# Patient Record
Sex: MALE | Race: WHITE | Employment: FULL TIME | URBAN - METROPOLITAN AREA
[De-identification: names, ages, dates, MRNs, and addresses within clinical notes are randomized per-mention and may not be internally consistent; named-entity substitution may affect disease eponyms.]

---

## 2017-01-26 ENCOUNTER — GENERIC CONVERSION - ENCOUNTER (OUTPATIENT)
Dept: OTHER | Facility: OTHER | Age: 62
End: 2017-01-26

## 2017-02-14 ENCOUNTER — GENERIC CONVERSION - ENCOUNTER (OUTPATIENT)
Dept: OTHER | Facility: OTHER | Age: 62
End: 2017-02-14

## 2017-03-20 ENCOUNTER — INPATIENT (INPATIENT)
Facility: HOSPITAL | Age: 62
LOS: 1 days | Discharge: HOME | DRG: 520 | End: 2017-03-22
Payer: COMMERCIAL

## 2017-03-20 VITALS
RESPIRATION RATE: 18 BRPM | DIASTOLIC BLOOD PRESSURE: 97 MMHG | HEIGHT: 75 IN | HEART RATE: 80 BPM | TEMPERATURE: 98 F | SYSTOLIC BLOOD PRESSURE: 132 MMHG | WEIGHT: 201.94 LBS | OXYGEN SATURATION: 97 %

## 2017-03-20 DIAGNOSIS — Z01.818 ENCOUNTER FOR OTHER PREPROCEDURAL EXAMINATION: ICD-10-CM

## 2017-03-20 DIAGNOSIS — K21.9 GASTRO-ESOPHAGEAL REFLUX DISEASE WITHOUT ESOPHAGITIS: ICD-10-CM

## 2017-03-20 DIAGNOSIS — M48.02 SPINAL STENOSIS, CERVICAL REGION: ICD-10-CM

## 2017-03-20 LAB
ALBUMIN SERPL ELPH-MCNC: 3.7 G/DL — SIGNIFICANT CHANGE UP (ref 3.4–5)
ALP SERPL-CCNC: 38 U/L — LOW (ref 40–120)
ALT FLD-CCNC: 27 U/L — SIGNIFICANT CHANGE UP (ref 12–42)
ANION GAP SERPL CALC-SCNC: 6 MMOL/L — LOW (ref 9–16)
APPEARANCE UR: CLEAR — SIGNIFICANT CHANGE UP
APTT BLD: 32.3 SEC — SIGNIFICANT CHANGE UP (ref 27.5–37.4)
AST SERPL-CCNC: 14 U/L — LOW (ref 15–37)
BASOPHILS NFR BLD AUTO: 0.2 % — SIGNIFICANT CHANGE UP (ref 0–2)
BILIRUB SERPL-MCNC: 0.4 MG/DL — SIGNIFICANT CHANGE UP (ref 0.2–1.2)
BILIRUB UR-MCNC: NEGATIVE — SIGNIFICANT CHANGE UP
BLD GP AB SCN SERPL QL: NEGATIVE — SIGNIFICANT CHANGE UP
BUN SERPL-MCNC: 19 MG/DL — SIGNIFICANT CHANGE UP (ref 7–23)
CALCIUM SERPL-MCNC: 9.3 MG/DL — SIGNIFICANT CHANGE UP (ref 8.5–10.5)
CHLORIDE SERPL-SCNC: 109 MMOL/L — HIGH (ref 96–108)
CO2 SERPL-SCNC: 28 MMOL/L — SIGNIFICANT CHANGE UP (ref 22–31)
COLOR SPEC: YELLOW — SIGNIFICANT CHANGE UP
CREAT SERPL-MCNC: 0.95 MG/DL — SIGNIFICANT CHANGE UP (ref 0.5–1.3)
DIFF PNL FLD: (no result)
EOSINOPHIL NFR BLD AUTO: 0.8 % — SIGNIFICANT CHANGE UP (ref 0–6)
GLUCOSE SERPL-MCNC: 88 MG/DL — SIGNIFICANT CHANGE UP (ref 70–99)
GLUCOSE UR QL: NEGATIVE — SIGNIFICANT CHANGE UP
HCT VFR BLD CALC: 42.3 % — SIGNIFICANT CHANGE UP (ref 39–50)
HGB BLD-MCNC: 14.2 G/DL — SIGNIFICANT CHANGE UP (ref 13–17)
INR BLD: 1.02 — SIGNIFICANT CHANGE UP (ref 0.88–1.16)
KETONES UR-MCNC: NEGATIVE — SIGNIFICANT CHANGE UP
LEUKOCYTE ESTERASE UR-ACNC: (no result)
LYMPHOCYTES # BLD AUTO: 24.1 % — SIGNIFICANT CHANGE UP (ref 13–44)
MCHC RBC-ENTMCNC: 31.7 PG — SIGNIFICANT CHANGE UP (ref 27–34)
MCHC RBC-ENTMCNC: 33.6 G/DL — SIGNIFICANT CHANGE UP (ref 32–36)
MCV RBC AUTO: 94.4 FL — SIGNIFICANT CHANGE UP (ref 80–100)
MONOCYTES NFR BLD AUTO: 11.2 % — SIGNIFICANT CHANGE UP (ref 2–14)
NEUTROPHILS NFR BLD AUTO: 63.7 % — SIGNIFICANT CHANGE UP (ref 43–77)
NITRITE UR-MCNC: NEGATIVE — SIGNIFICANT CHANGE UP
PH UR: 5.5 — SIGNIFICANT CHANGE UP (ref 4–8)
PLATELET # BLD AUTO: 208 K/UL — SIGNIFICANT CHANGE UP (ref 150–400)
POTASSIUM SERPL-MCNC: 4.2 MMOL/L — SIGNIFICANT CHANGE UP (ref 3.5–5.3)
POTASSIUM SERPL-SCNC: 4.2 MMOL/L — SIGNIFICANT CHANGE UP (ref 3.5–5.3)
PROT SERPL-MCNC: 7 G/DL — SIGNIFICANT CHANGE UP (ref 6.4–8.2)
PROT UR-MCNC: NEGATIVE MG/DL — SIGNIFICANT CHANGE UP
PROTHROM AB SERPL-ACNC: 11.3 SEC — SIGNIFICANT CHANGE UP (ref 10–13.1)
RBC # BLD: 4.48 M/UL — SIGNIFICANT CHANGE UP (ref 4.2–5.8)
RBC # FLD: 14.2 % — SIGNIFICANT CHANGE UP (ref 10.3–16.9)
RH IG SCN BLD-IMP: POSITIVE — SIGNIFICANT CHANGE UP
SODIUM SERPL-SCNC: 143 MMOL/L — SIGNIFICANT CHANGE UP (ref 135–145)
SP GR SPEC: >=1.03 — SIGNIFICANT CHANGE UP (ref 1–1.03)
UROBILINOGEN FLD QL: 0.2 E.U./DL — SIGNIFICANT CHANGE UP
WBC # BLD: 6.1 K/UL — SIGNIFICANT CHANGE UP (ref 3.8–10.5)
WBC # FLD AUTO: 6.1 K/UL — SIGNIFICANT CHANGE UP (ref 3.8–10.5)

## 2017-03-20 PROCEDURE — 99222 1ST HOSP IP/OBS MODERATE 55: CPT | Mod: GC

## 2017-03-20 PROCEDURE — 71020: CPT | Mod: 26

## 2017-03-20 PROCEDURE — 72141 MRI NECK SPINE W/O DYE: CPT | Mod: 26

## 2017-03-20 PROCEDURE — 99285 EMERGENCY DEPT VISIT HI MDM: CPT | Mod: 25

## 2017-03-20 PROCEDURE — 72040 X-RAY EXAM NECK SPINE 2-3 VW: CPT | Mod: 26

## 2017-03-20 PROCEDURE — 93010 ELECTROCARDIOGRAM REPORT: CPT | Mod: NC

## 2017-03-20 RX ORDER — KETOROLAC TROMETHAMINE 30 MG/ML
30 SYRINGE (ML) INJECTION ONCE
Qty: 0 | Refills: 0 | Status: DISCONTINUED | OUTPATIENT
Start: 2017-03-20 | End: 2017-03-20

## 2017-03-20 RX ORDER — INFLUENZA VIRUS VACCINE 15; 15; 15; 15 UG/.5ML; UG/.5ML; UG/.5ML; UG/.5ML
0.5 SUSPENSION INTRAMUSCULAR ONCE
Qty: 0 | Refills: 0 | Status: COMPLETED | OUTPATIENT
Start: 2017-03-20 | End: 2017-03-20

## 2017-03-20 RX ORDER — SODIUM CHLORIDE 9 MG/ML
1000 INJECTION, SOLUTION INTRAVENOUS
Qty: 0 | Refills: 0 | Status: DISCONTINUED | OUTPATIENT
Start: 2017-03-21 | End: 2017-03-21

## 2017-03-20 RX ORDER — DOCUSATE SODIUM 100 MG
100 CAPSULE ORAL THREE TIMES A DAY
Qty: 0 | Refills: 0 | Status: DISCONTINUED | OUTPATIENT
Start: 2017-03-20 | End: 2017-03-21

## 2017-03-20 RX ORDER — DIAZEPAM 5 MG
5 TABLET ORAL ONCE
Qty: 0 | Refills: 0 | Status: DISCONTINUED | OUTPATIENT
Start: 2017-03-20 | End: 2017-03-20

## 2017-03-20 RX ORDER — METOCLOPRAMIDE HCL 10 MG
10 TABLET ORAL EVERY 6 HOURS
Qty: 0 | Refills: 0 | Status: DISCONTINUED | OUTPATIENT
Start: 2017-03-20 | End: 2017-03-21

## 2017-03-20 RX ORDER — ACETAMINOPHEN 500 MG
650 TABLET ORAL EVERY 6 HOURS
Qty: 0 | Refills: 0 | Status: DISCONTINUED | OUTPATIENT
Start: 2017-03-20 | End: 2017-03-21

## 2017-03-20 RX ORDER — OXYCODONE HYDROCHLORIDE 5 MG/1
5 TABLET ORAL EVERY 4 HOURS
Qty: 0 | Refills: 0 | Status: DISCONTINUED | OUTPATIENT
Start: 2017-03-20 | End: 2017-03-21

## 2017-03-20 RX ADMIN — Medication 30 MILLIGRAM(S): at 09:33

## 2017-03-20 RX ADMIN — Medication 5 MILLIGRAM(S): at 09:18

## 2017-03-20 RX ADMIN — OXYCODONE HYDROCHLORIDE 5 MILLIGRAM(S): 5 TABLET ORAL at 19:26

## 2017-03-20 RX ADMIN — Medication 100 MILLIGRAM(S): at 19:27

## 2017-03-20 RX ADMIN — OXYCODONE HYDROCHLORIDE 5 MILLIGRAM(S): 5 TABLET ORAL at 20:10

## 2017-03-20 RX ADMIN — OXYCODONE HYDROCHLORIDE 5 MILLIGRAM(S): 5 TABLET ORAL at 23:58

## 2017-03-20 RX ADMIN — Medication 30 MILLIGRAM(S): at 09:18

## 2017-03-20 NOTE — ED ADULT NURSE NOTE - CHIEF COMPLAINT QUOTE
s/p trip and fall about 10 minutes ago with neck pain radiates to left shoulder,  tingling sensation on his left arm and hand,  hx of spinal stenosis, denies loc.

## 2017-03-20 NOTE — H&P ADULT - NSHPLABSRESULTS_GEN_ALL_CORE
MRI C-spine IMPRESSION:  1. Central canal stenosis from the C3-C4 through to the C6-C7 level, which is moderate to severe at the C5-C6 level and moderate at the C3-C4 level, which relates to acquired etiologies from osteoarthritis and disc disease. There may be a congenital component from short pedicle morphology.  Foci of abnormal T2 signal within the intramedullary intradural cervical spinal cord at the C5-C6 level to a greater extent than the C3-C4 level, which is consistent with myelomalacia and/or gliosis.   2. Multilevel degenerative disc disease with disc-osteophyte complex from C3-C4 through to the C6-C7 levels inclusive including broad-based central disc protrusions at the C3-C4 level and C6-C7 levels.  3. Multilevel degenerative osteoarthritis.    XR C-spine - pending    CBC, BMP - WNL  PT/PTT/INR - pending

## 2017-03-20 NOTE — PROGRESS NOTE ADULT - SUBJECTIVE AND OBJECTIVE BOX
Diagnosis: Cervical stenosis  Procedure: Posterior Cervical Laminectomy C3-C6  Surgeon: Dr. Oglesby                          14.2   6.1   )-----------( 208      ( 20 Mar 2017 09:26 )             42.3     20 Mar 2017 09:26    143    |  109    |  19     ----------------------------<  88     4.2     |  28     |  0.95     Ca    9.3        20 Mar 2017 09:26    TPro  7.0    /  Alb  3.7    /  TBili  0.4    /  DBili  x      /  AST  14     /  ALT  27     /  AlkPhos  38     20 Mar 2017 09:26          [ ] Type & Screen  [x] CBC  [x] BMP  [ ] PT/PTT/INR  [ ] Urinalysis  [ ] Chest X-ray  [ ] EKG  [x] NPO/IVF  [x] Consent  [ ] Clearance - Pj  [ ] Added on to OR Schedule  [x] Anti-coagulation held    Assessment & Plan:  61yMale with cervical spine stenosis  -For OR 3/21 Diagnosis: Cervical stenosis  Procedure: Posterior Cervical Laminectomy C3-C6  Surgeon: Dr. Oglesby                          14.2   6.1   )-----------( 208      ( 20 Mar 2017 09:26 )             42.3     20 Mar 2017 09:26    143    |  109    |  19     ----------------------------<  88     4.2     |  28     |  0.95     Ca    9.3        20 Mar 2017 09:26    TPro  7.0    /  Alb  3.7    /  TBili  0.4    /  DBili  x      /  AST  14     /  ALT  27     /  AlkPhos  38     20 Mar 2017 09:26          [ ] Type & Screen  [x] CBC  [x] BMP  [ ] PT/PTT/INR  [ ] Urinalysis  [ ] Chest X-ray  [ ] EKG  [x] NPO/IVF  [x] Consent  [ ] Clearance - Pj  [x] Added on to OR Schedule  [x] Anti-coagulation held    Assessment & Plan:  61yMale with cervical spine stenosis  -For OR 3/21 Diagnosis: Cervical stenosis  Procedure: Posterior Cervical Laminectomy C3-C6  Surgeon: Dr. Oglesby                          14.2   6.1   )-----------( 208      ( 20 Mar 2017 09:26 )             42.3     20 Mar 2017 09:26    143    |  109    |  19     ----------------------------<  88     4.2     |  28     |  0.95     Ca    9.3        20 Mar 2017 09:26    TPro  7.0    /  Alb  3.7    /  TBili  0.4    /  DBili  x      /  AST  14     /  ALT  27     /  AlkPhos  38     20 Mar 2017 09:26          [ ] Type & Screen  [x] CBC  [x] BMP  [x] PT/PTT/INR  [ ] Urinalysis  [x] Chest X-ray  [ ] EKG  [x] NPO/IVF  [x] Consent  [ ] Clearance - Pj  [x] Added on to OR Schedule  [x] Anti-coagulation held    Assessment & Plan:  61yMale with cervical spine stenosis  -For OR 3/21

## 2017-03-20 NOTE — H&P ADULT - PROBLEM SELECTOR PLAN 1
Admit to Orthopaedic Service, Dr. Oglesby  Scheduled for Posterior Cervical Lami C3-C6 tomorrow with Dr. Oglesby  Consented for procedure  NPO after midnight, IVF  Pre-op labs  Medical clearance per Dr. Oliva - pending  Pain control

## 2017-03-20 NOTE — ED ADULT NURSE NOTE - OBJECTIVE STATEMENT
pt received into spot 20 A&OX3 ambulatory appears comfortable complaining of 7/10 neck pain and b/l arm pain s/p mechanical slip and fall 10 minutes prior t arrival. Denies head trauma LOC or blood thinner use. No obvious deformity noted. B/l strong equal hand grasp noted. Admits to numbness/tingling to both arms noted it as "nerve pain". Sensation is intact and equal to both right and elft upper extremities hx of spinal stenosis.

## 2017-03-20 NOTE — ED ADULT TRIAGE NOTE - CHIEF COMPLAINT QUOTE
s/p trip and fall about 10 minutes ago with neck pain radiates to left shoulder,  tingling sensation on his left arm and hand hx of spinal stenosis s/p trip and fall about 10 minutes ago with neck pain radiates to left shoulder,  tingling sensation on his left arm and hand,  hx of spinal stenosis, denies loc.

## 2017-03-20 NOTE — H&P ADULT - HISTORY OF PRESENT ILLNESS
61M with pmh of c-spine stenosis presents c/o neck pain s/p mechanical fall this morning. Pt reports that he was walking outside with his wife when he lost his footing on the curb and fell. Pt reports that he immediately felt a "shift" in his neck, followed by sharp, shooting "nerve pain" from the midline of his neck down bilateral upper extr. Pt endorsing tingling sensation in left ring finger. Denies overt numbness or weakness of extr. Denies LOC, head trauma, other injuries. Pt is known to Dr. Oglesby and has had milder version of current pain evaluated by Dr. Oglesby in the past. Denies CP, SOB, N/V, tactile fevers.

## 2017-03-20 NOTE — ED PROVIDER NOTE - MEDICAL DECISION MAKING DETAILS
Patient admitted for laminectomy for tomorrow due to severe neck pain. No ac fracture. Case discussed with ortho. Dr. England is patient's doctor. Labs wnl, NAD and vSS. Patient well appearing and pain better controlled.

## 2017-03-20 NOTE — CONSULT NOTE ADULT - PROBLEM SELECTOR RECOMMENDATION 2
The patient's medical condition is optimized for surgery.  There is no contraindication for surgery.  There is no clinical evidence neither of angina, decompensated CHF, arrhthymias, nor valvular disease.   There is no limitation of exercise capacity.  MET is  .  ASA class is .  Novak cardiac risk factor is  .  DVT prophylaxis is indicated.  Pain control.  Early mobilization.  Avoid fluid overload.

## 2017-03-20 NOTE — CONSULT NOTE ADULT - SUBJECTIVE AND OBJECTIVE BOX
Patient is a 61y old  Male who presents with a chief complaint of neck pain (20 Mar 2017 11:47)      HPI:  61M with pmh of c-spine stenosis presents c/o neck pain s/p mechanical fall this morning. Pt reports that he was walking outside with his wife when he lost his footing on the curb and fell. Pt reports that he immediately felt a "shift" in his neck, followed by sharp, shooting "nerve pain" from the midline of his neck down bilateral upper extr. Pt endorsing tingling sensation in left ring finger. Denies overt numbness or weakness of extr. Denies LOC, head trauma, other injuries. Pt is known to Dr. Oglesby and has had milder version of current pain evaluated by Dr. Oglesby in the past. Denies CP, SOB, N/V, tactile fevers. (20 Mar 2017 11:47)      PAST MEDICAL & SURGICAL HISTORY:  Spinal stenosis  GERD  No significant past surgical history      FAMILY HISTORY:  No pertinent family history in first degree relatives      SOCIAL HISTORY:  Smoking Status: [ ] Current, [ ] Former, [ x] Never  Pack Years:    MEDICATIONS:  Pulmonary:    Antimicrobials:    Anticoagulants:    Onc:    GI/:  docusate sodium 100milliGRAM(s) Oral three times a day    Endocrine:    Cardiac:    Other Medications:  acetaminophen   Tablet. 650milliGRAM(s) Oral every 6 hours PRN  oxyCODONE IR 5milliGRAM(s) Oral every 4 hours PRN  metoclopramide Injectable 10milliGRAM(s) IV Push every 6 hours PRN      Allergies    iv dye (Rash)  penicillins (Rash)    Intolerances        Vital Signs Last 24 Hrs  T(C): 36.4, Max: 36.8 (03-20 @ 11:52)  T(F): 97.6, Max: 98.3 (03-20 @ 11:52)  HR: 50 (50 - 80)  BP: 133/79 (114/70 - 133/79)  BP(mean): --  RR: 15 (15 - 18)  SpO2: 98% (97% - 98%)    I & Os for current day (as of 03-20 @ 23:18)  =============================================  IN: 360 ml / OUT: 0 ml / NET: 360 ml        LABS:      CBC Full  -  ( 20 Mar 2017 09:26 )  WBC Count : 6.1 K/uL  Hemoglobin : 14.2 g/dL  Hematocrit : 42.3 %  Platelet Count - Automated : 208 K/uL  Mean Cell Volume : 94.4 fL  Mean Cell Hemoglobin : 31.7 pg  Mean Cell Hemoglobin Concentration : 33.6 g/dL  Auto Neutrophil # : x  Auto Lymphocyte # : x  Auto Monocyte # : x  Auto Eosinophil # : x  Auto Basophil # : x  Auto Neutrophil % : 63.7 %  Auto Lymphocyte % : 24.1 %  Auto Monocyte % : 11.2 %  Auto Eosinophil % : 0.8 %  Auto Basophil % : 0.2 %    20 Mar 2017 09:26    143    |  109    |  19     ----------------------------<  88     4.2     |  28     |  0.95     Ca    9.3        20 Mar 2017 09:26    TPro  7.0    /  Alb  3.7    /  TBili  0.4    /  DBili  x      /  AST  14     /  ALT  27     /  AlkPhos  38     20 Mar 2017 09:26    PT/INR - ( 20 Mar 2017 11:16 )   PT: 11.3 sec;   INR: 1.02          PTT - ( 20 Mar 2017 11:16 )  PTT:32.3 sec      Urinalysis Basic - ( 20 Mar 2017 13:46 )    Color: Yellow / Appearance: Clear / SG: >=1.030 / pH: x  Gluc: x / Ketone: NEGATIVE  / Bili: NEGATIVE / Urobili: 0.2 E.U./dL   Blood: x / Protein: NEGATIVE mg/dL / Nitrite: NEGATIVE   Leuk Esterase: Trace / RBC: < 5 /HPF / WBC < 5 /HPF   Sq Epi: x / Non Sq Epi: Rare /HPF / Bacteria: Present /HPF      EKG      Ventricular Rate 59 BPM    Atrial Rate 59 BPM    P-R Interval 212 ms    QRS Duration 112 ms    Q-T Interval 396 ms    QTC Calculation(Bezet) 392 ms    P Axis 51 degrees    R Axis -54 degrees    T Axis 57 degrees      CXR NAP      RADIOLOGY & ADDITIONAL STUDIES (The following images were personally reviewed):

## 2017-03-20 NOTE — H&P ADULT - NSHPPHYSICALEXAM_GEN_ALL_CORE
MSK:  +Decreased ROM secondary to pain, cervical spine  Delt/bicep/tricep 5/5 strength bilateral UE. Wrist flex/ext intact. Motor intact to AIN/PIN/ulna.  strength intact bilateral UE.  Sensation intact and equal to bilateral UE distally.  Radial pulses palpable, fingers warm and well perfused, cap refill brisk.

## 2017-03-20 NOTE — ED PROVIDER NOTE - OBJECTIVE STATEMENT
62 y/o m with h/o spinal stenosis presents to ED c/o increase neck pain s/p mechanical fall. He states of tripped and fell onto his knees and hands. States of shooting pain radiating upward to his arms, with paresthesia. He states of some baseline paresthesia to left hand. 60 y/o m with h/o spinal stenosis presents to ED c/o increase neck pain s/p mechanical fall. He states of tripped and fell onto his knees and hands. States of shooting pain radiating upward to his arms, with paresthesia right greater than left.  He states of some baseline paresthesia to left hand. Patient saw Dr. England a month ago and had a MRI of neck. Pending PT as outpatient.

## 2017-03-20 NOTE — ED PROVIDER NOTE - ATTENDING CONTRIBUTION TO CARE
62 yo male h/o spinal stenosis c/o neck pain, bilat arm numbness s/p trip and fall onto hands and knees w/o weakness, incontinence.  No cp, sob.  Pt sent for mri, eval by ortho - tba for further mgmt.

## 2017-03-21 RX ORDER — METOCLOPRAMIDE HCL 10 MG
10 TABLET ORAL
Qty: 0 | Refills: 0 | Status: DISCONTINUED | OUTPATIENT
Start: 2017-03-21 | End: 2017-03-22

## 2017-03-21 RX ORDER — MORPHINE SULFATE 50 MG/1
4 CAPSULE, EXTENDED RELEASE ORAL EVERY 4 HOURS
Qty: 0 | Refills: 0 | Status: DISCONTINUED | OUTPATIENT
Start: 2017-03-21 | End: 2017-03-21

## 2017-03-21 RX ORDER — HYDROMORPHONE HYDROCHLORIDE 2 MG/ML
0.5 INJECTION INTRAMUSCULAR; INTRAVENOUS; SUBCUTANEOUS
Qty: 0 | Refills: 0 | Status: DISCONTINUED | OUTPATIENT
Start: 2017-03-21 | End: 2017-03-21

## 2017-03-21 RX ORDER — HYDROMORPHONE HYDROCHLORIDE 2 MG/ML
1 INJECTION INTRAMUSCULAR; INTRAVENOUS; SUBCUTANEOUS EVERY 6 HOURS
Qty: 0 | Refills: 0 | Status: DISCONTINUED | OUTPATIENT
Start: 2017-03-21 | End: 2017-03-22

## 2017-03-21 RX ORDER — MAGNESIUM HYDROXIDE 400 MG/1
30 TABLET, CHEWABLE ORAL EVERY 12 HOURS
Qty: 0 | Refills: 0 | Status: DISCONTINUED | OUTPATIENT
Start: 2017-03-21 | End: 2017-03-22

## 2017-03-21 RX ORDER — ONDANSETRON 8 MG/1
4 TABLET, FILM COATED ORAL EVERY 4 HOURS
Qty: 0 | Refills: 0 | Status: DISCONTINUED | OUTPATIENT
Start: 2017-03-21 | End: 2017-03-22

## 2017-03-21 RX ORDER — FAMOTIDINE 10 MG/ML
20 INJECTION INTRAVENOUS EVERY 12 HOURS
Qty: 0 | Refills: 0 | Status: DISCONTINUED | OUTPATIENT
Start: 2017-03-21 | End: 2017-03-22

## 2017-03-21 RX ORDER — DOCUSATE SODIUM 100 MG
100 CAPSULE ORAL THREE TIMES A DAY
Qty: 0 | Refills: 0 | Status: DISCONTINUED | OUTPATIENT
Start: 2017-03-21 | End: 2017-03-22

## 2017-03-21 RX ORDER — BUPIVACAINE 13.3 MG/ML
20 INJECTION, SUSPENSION, LIPOSOMAL INFILTRATION ONCE
Qty: 0 | Refills: 0 | Status: DISCONTINUED | OUTPATIENT
Start: 2017-03-21 | End: 2017-03-22

## 2017-03-21 RX ORDER — VANCOMYCIN HCL 1 G
1000 VIAL (EA) INTRAVENOUS ONCE
Qty: 0 | Refills: 0 | Status: COMPLETED | OUTPATIENT
Start: 2017-03-21 | End: 2017-03-22

## 2017-03-21 RX ORDER — SCOPALAMINE 1 MG/3D
1.5 PATCH, EXTENDED RELEASE TRANSDERMAL ONCE
Qty: 0 | Refills: 0 | Status: COMPLETED | OUTPATIENT
Start: 2017-03-21 | End: 2017-03-21

## 2017-03-21 RX ORDER — SODIUM CHLORIDE 9 MG/ML
1000 INJECTION, SOLUTION INTRAVENOUS
Qty: 0 | Refills: 0 | Status: DISCONTINUED | OUTPATIENT
Start: 2017-03-21 | End: 2017-03-22

## 2017-03-21 RX ORDER — SENNA PLUS 8.6 MG/1
2 TABLET ORAL AT BEDTIME
Qty: 0 | Refills: 0 | Status: DISCONTINUED | OUTPATIENT
Start: 2017-03-21 | End: 2017-03-22

## 2017-03-21 RX ADMIN — SCOPALAMINE 1.5 MILLIGRAM(S): 1 PATCH, EXTENDED RELEASE TRANSDERMAL at 19:43

## 2017-03-21 RX ADMIN — HYDROMORPHONE HYDROCHLORIDE 0.5 MILLIGRAM(S): 2 INJECTION INTRAMUSCULAR; INTRAVENOUS; SUBCUTANEOUS at 15:45

## 2017-03-21 RX ADMIN — OXYCODONE HYDROCHLORIDE 5 MILLIGRAM(S): 5 TABLET ORAL at 00:58

## 2017-03-21 RX ADMIN — HYDROMORPHONE HYDROCHLORIDE 1 MILLIGRAM(S): 2 INJECTION INTRAMUSCULAR; INTRAVENOUS; SUBCUTANEOUS at 14:25

## 2017-03-21 RX ADMIN — ONDANSETRON 4 MILLIGRAM(S): 8 TABLET, FILM COATED ORAL at 17:44

## 2017-03-21 RX ADMIN — HYDROMORPHONE HYDROCHLORIDE 0.5 MILLIGRAM(S): 2 INJECTION INTRAMUSCULAR; INTRAVENOUS; SUBCUTANEOUS at 16:13

## 2017-03-21 RX ADMIN — OXYCODONE HYDROCHLORIDE 5 MILLIGRAM(S): 5 TABLET ORAL at 04:44

## 2017-03-21 RX ADMIN — HYDROMORPHONE HYDROCHLORIDE 0.5 MILLIGRAM(S): 2 INJECTION INTRAMUSCULAR; INTRAVENOUS; SUBCUTANEOUS at 15:00

## 2017-03-21 RX ADMIN — SODIUM CHLORIDE 80 MILLILITER(S): 9 INJECTION, SOLUTION INTRAVENOUS at 14:56

## 2017-03-21 RX ADMIN — HYDROMORPHONE HYDROCHLORIDE 1 MILLIGRAM(S): 2 INJECTION INTRAMUSCULAR; INTRAVENOUS; SUBCUTANEOUS at 14:10

## 2017-03-21 RX ADMIN — SODIUM CHLORIDE 80 MILLILITER(S): 9 INJECTION, SOLUTION INTRAVENOUS at 00:01

## 2017-03-21 RX ADMIN — Medication 100 MILLIGRAM(S): at 22:48

## 2017-03-21 RX ADMIN — OXYCODONE HYDROCHLORIDE 5 MILLIGRAM(S): 5 TABLET ORAL at 05:44

## 2017-03-21 RX ADMIN — SENNA PLUS 2 TABLET(S): 8.6 TABLET ORAL at 22:48

## 2017-03-21 RX ADMIN — HYDROMORPHONE HYDROCHLORIDE 0.5 MILLIGRAM(S): 2 INJECTION INTRAMUSCULAR; INTRAVENOUS; SUBCUTANEOUS at 14:45

## 2017-03-21 NOTE — DISCHARGE NOTE ADULT - MEDICATION SUMMARY - MEDICATIONS TO TAKE
I will START or STAY ON the medications listed below when I get home from the hospital:    acetaminophen-oxyCODONE 325 mg-5 mg oral tablet  -- 1 to 2 tab(s) by mouth every 4 hours, As Needed MDD:eight  -- Caution federal law prohibits the transfer of this drug to any person other  than the person for whom it was prescribed.  May cause drowsiness.  Alcohol may intensify this effect.  Use care when operating dangerous machinery.  This prescription cannot be refilled.  This product contains acetaminophen.  Do not use  with any other product containing acetaminophen to prevent possible liver damage.  Using more of this medication than prescribed may cause serious breathing problems.    -- Indication: For Pain    docusate sodium 100 mg oral capsule  -- 1 cap(s) by mouth 3 times a day  -- Indication: For constipation

## 2017-03-21 NOTE — DISCHARGE NOTE ADULT - CARE PLAN
Principal Discharge DX:	Spinal stenosis of cervical region  Goal:	improvement after surgery  Instructions for follow-up, activity and diet:	see below

## 2017-03-21 NOTE — DISCHARGE NOTE ADULT - HOSPITAL COURSE
Admitted  Surgery -  Perioperative abx  Pain control  DVT ppx  Med consult Admitted  Surgery - post cerv lami C3 to C6 3/21/17  Perioperative abx  Pain control  DVT ppx  Med consult

## 2017-03-21 NOTE — PROGRESS NOTE ADULT - SUBJECTIVE AND OBJECTIVE BOX
Orthopaedics Post Op Check    Procedure: Posterior Cervical Fusion C3-C6  Surgeon: Dr. Oglesby    Pt comfortable, without complaints  Denies CP, SOB, N/V; pt reports trace bilateral hand numbness/tingling existing preop    Vital Signs Last 24 Hrs  T(C): 36.3, Max: 36.6 (03-21 @ 05:00)  T(F): 97.4, Max: 97.9 (03-21 @ 05:00)  HR: 57 (46 - 66)  BP: 160/98 (116/72 - 160/98)  BP(mean): --  RR: 16 (15 - 16)  SpO2: 100% (98% - 100%)  AVSS, NAD    Dressing C/D/I, one hemovac drain  General: Pt Alert and oriented     Pulses: Radial pulses 2+ bilaterally  Sensation: Sensation in tact and equal to distal bilateral upper extremities  Motor:  Motor Strength 5/5 to /interossei/triceps/biceps/deltoid bilaterally. AIN/PIN intact.                           14.2   6.1   )-----------( 208      ( 20 Mar 2017 09:26 )             42.3   20 Mar 2017 09:26    143    |  109    |  19     ----------------------------<  88     4.2     |  28     |  0.95     Ca    9.3        20 Mar 2017 09:26    TPro  7.0    /  Alb  3.7    /  TBili  0.4    /  DBili  x      /  AST  14     /  ALT  27     /  AlkPhos  38     20 Mar 2017 09:26    A/P: 61yMale POD#0 s/p PSF C3-C6  - Stable  - Pain Control  - DVT ppx: SCDs  - Post op abx: Vanco  - PT, WBS: WBAT  - F/U AM Labs

## 2017-03-21 NOTE — CONSULT NOTE ADULT - SUBJECTIVE AND OBJECTIVE BOX
Patient is a 61y old  Male who presents with a chief complaint of neck pain (21 Mar 2017 07:51)        HPI:  61M with pmh of c-spine stenosis presents c/o neck pain s/p mechanical fall this morning. Pt reports that he was walking outside with his wife when he lost his footing on the curb and fell. Pt reports that he immediately felt a "shift" in his neck, followed by sharp, shooting "nerve pain" from the midline of his neck down bilateral upper extr. Pt endorsing tingling sensation in left ring finger. Denies overt numbness or weakness of extr. Denies LOC, head trauma, other injuries. Pt is known to Dr. Oglesby and has had milder version of current pain evaluated by Dr. Oglesby in the past. Denies CP, SOB, N/V, tactile fevers. (20 Mar 2017 11:47)    Neck pain and radicular symptoms in the UE- no bowel or bladder symptoms      Allergies  iv dye (Rash)  penicillins (Rash)      Health Issues  SPINAL STENOSIS  No h/o HF  No pertinent family history in first degree relatives  Handoff  MEWS Score  Spinal stenosis  Spinal stenosis of cervical region  Neck pain  GERD (gastroesophageal reflux disease)  Preoperative clearance  Spinal stenosis of cervical region  No significant past surgical history  FALL  Spinal stenosis        FAMILY HISTORY:  No pertinent family history in first degree relatives      MEDICATIONS  (STANDING):  lactated ringers. 1000milliLiter(s) IV Continuous <Continuous>  docusate sodium 100milliGRAM(s) Oral three times a day    MEDICATIONS  (PRN):  acetaminophen   Tablet. 650milliGRAM(s) Oral every 6 hours PRN Mild Pain (1 - 3)  oxyCODONE IR 5milliGRAM(s) Oral every 4 hours PRN Severe Pain (7 - 10)  metoclopramide Injectable 10milliGRAM(s) IV Push every 6 hours PRN Nausea and/or Vomiting  morphine  - Injectable 4milliGRAM(s) IV Push every 4 hours PRN Breakthrough pain      PAST MEDICAL & SURGICAL HISTORY:  Spinal stenosis  No significant past surgical history      Labs                          14.2   6.1   )-----------( 208      ( 20 Mar 2017 09:26 )             42.3     20 Mar 2017 09:26    143    |  109    |  19     ----------------------------<  88     4.2     |  28     |  0.95     Ca    9.3        20 Mar 2017 09:26    TPro  7.0    /  Alb  3.7    /  TBili  0.4    /  DBili  x      /  AST  14     /  ALT  27     /  AlkPhos  38     20 Mar 2017 09:26      Radiology:    Physical Exam    MENTAL STATUS  -Level of Consciousness- awake    Orientation- person, place time  Language- aphasia/ dysarthria  Memory- recent and remote      Cranial Nerve 1- 12  Pupils- equal and reactive  Eye movements-nl  Facial - no asymmetry   Lower CN-nl    Gait and Station-nl    MOTOR  Upper- UE weakness  Lower- no long tract findings    Reflexes- increase LE    Sensation- decrease UE    Cerebellar- no tremors    vascular -intact    Assessment- Cervical Myelopathy    Plan Surgery as per Dr ivey

## 2017-03-21 NOTE — DISCHARGE NOTE ADULT - CARE PROVIDER_API CALL
Will Oglesby), Orthopaedic Surgery  [address] [place] [address]  Phone: [phone]  Fax: [phone]

## 2017-03-21 NOTE — DISCHARGE NOTE ADULT - ADDITIONAL INSTRUCTIONS
No strenuous activity, heavy lifting, driving, tub bathing, or returning to work until cleared by MD.  You may shower  Change dressing daily.  Remove dressing after post op day 5, then leave incision open to air.  Follow up with Dr. Oglesby in his office in 2 weeks.  Any staples/sutures will be removed in his office.   If you don't have a bowel movement by post op day 3, then take Milk of Magnesia (over the counter).  If no bowel movement by at least post op day 5, then use a Dulcolax suppository (over the counter) and/or a Fleets enema--if still no bowel movement, call your MD.  Contact your doctor if you experience: fever greater than 101.5, chills, chest pain, difficulty breathing, bleeding, redness or heat around the incision.    Please follow up with your primary care provider.

## 2017-03-21 NOTE — PROGRESS NOTE ADULT - SUBJECTIVE AND OBJECTIVE BOX
SUBJECTIVE: Patient seen and examined. Pain controlled.  Pt did well o/n  No f/c/n/v/cp/sob.     OBJECTIVE:    Vital Signs Last 24 Hrs  T(C): 36.6, Max: 36.8 (03-20 @ 11:52)  T(F): 97.9, Max: 98.3 (03-20 @ 11:52)  HR: 63 (50 - 63)  BP: 131/82 (114/70 - 133/79)  BP(mean): --  RR: 15 (15 - 18)  SpO2: 98% (97% - 98%)    Affected extremity:          Dressing: clean/dry/intact            Sensation: SILT         Motor exam: 5/5 TA/GS/EHL         warm well perfused; capillary refill <3 seconds                           14.2   6.1   )-----------( 208      ( 20 Mar 2017 09:26 )             42.3       A/P :  Pt is a 62yo Male with neck pain  -    Pain control  -    DVT ppx: SCD     -    Weight bearing status: WBAT   -    Physical Therapy  -    Dispo: OR today

## 2017-03-22 VITALS
OXYGEN SATURATION: 98 % | RESPIRATION RATE: 16 BRPM | HEART RATE: 74 BPM | SYSTOLIC BLOOD PRESSURE: 137 MMHG | DIASTOLIC BLOOD PRESSURE: 75 MMHG | TEMPERATURE: 99 F

## 2017-03-22 RX ORDER — DOCUSATE SODIUM 100 MG
1 CAPSULE ORAL
Qty: 0 | Refills: 0 | COMMUNITY
Start: 2017-03-22

## 2017-03-22 RX ORDER — OXYCODONE HYDROCHLORIDE 5 MG/1
1 TABLET ORAL
Qty: 60 | Refills: 0 | OUTPATIENT
Start: 2017-03-22

## 2017-03-22 RX ADMIN — Medication 250 MILLIGRAM(S): at 00:13

## 2017-03-22 RX ADMIN — Medication 100 MILLIGRAM(S): at 05:43

## 2017-03-22 NOTE — PROGRESS NOTE ADULT - SUBJECTIVE AND OBJECTIVE BOX
Neurology Follow up note    Name  NAZIA JULIAN    HPI:  61M with pmh of c-spine stenosis presents c/o neck pain s/p mechanical fall this morning. Pt reports that he was walking outside with his wife when he lost his footing on the curb and fell. Pt reports that he immediately felt a "shift" in his neck, followed by sharp, shooting "nerve pain" from the midline of his neck down bilateral upper extr. Pt endorsing tingling sensation in left ring finger. Denies overt numbness or weakness of extr. Denies LOC, head trauma, other injuries. Pt is known to Dr. Oglesby and has had milder version of current pain evaluated by Dr. Oglesby in the past. Denies CP, SOB, N/V, tactile fevers. (20 Mar 2017 11:47)      Interval History - no more nausea- less numbness and tingling- no new weakness post op-        REVIEW OF SYSTEMS    Vital Signs Last 24 Hrs  T(C): 36.3, Max: 36.6 (03-21 @ 09:14)  T(F): 97.3, Max: 97.9 (03-21 @ 09:14)  HR: 60 (46 - 89)  BP: 110/63 (110/63 - 160/98)  BP(mean): --  RR: 16 (15 - 17)  SpO2: 98% (98% - 100%)    Physical Exam-  awake and alert    Mental Status-nl    Cranial Nerves-nl    Gait and station- no long tract findings    Motor- nl    Reflexes- improved in LE    Sensation- no sensory loss    Coordination- no tremors    Vascular -no bruits    Medications  BUpivacaine liposome 1.3% Injectable (no eMAR) 20milliLiter(s) Local Injection once  lactated ringers. 1000milliLiter(s) IV Continuous <Continuous>  oxyCODONE  5 mG/acetaminophen 325 mG 1Tablet(s) Oral every 4 hours PRN  oxyCODONE  5 mG/acetaminophen 325 mG 2Tablet(s) Oral every 6 hours PRN  HYDROmorphone  Injectable 1milliGRAM(s) SubCutaneous every 6 hours PRN  famotidine    Tablet 20milliGRAM(s) Oral every 12 hours PRN  docusate sodium 100milliGRAM(s) Oral three times a day  magnesium hydroxide Suspension 30milliLiter(s) Oral every 12 hours PRN  ondansetron Injectable 4milliGRAM(s) IV Push every 4 hours PRN  metoclopramide 10milliGRAM(s) Oral four times a day PRN  senna 2Tablet(s) Oral at bedtime      Lab      Radiology    Assessment- Cervical myelopathy-m radiculopathy- improved    Plan

## 2017-03-22 NOTE — PROGRESS NOTE ADULT - SUBJECTIVE AND OBJECTIVE BOX
SUBJECTIVE: Patient seen and examined. Pain but controlled. No issuesl o/n  No f/c/n/v/cp/sob. Reports b/l hand numbness    OBJECTIVE:    Vital Signs Last 24 Hrs  T(C): 36.3, Max: 36.6 (03-21 @ 06:01)  T(F): 97.3, Max: 97.9 (03-21 @ 06:01)  HR: 60 (46 - 89)  BP: 110/63 (110/63 - 160/98)  BP(mean): --  RR: 16 (15 - 17)  SpO2: 98% (98% - 100%)Vital Signs Last 24 Hrs  T(C): 36.6, Max: 36.8 (03-20 @ 11:52)  T(F): 97.9, Max: 98.3 (03-20 @ 11:52)  HR: 63 (50 - 63)  BP: 131/82 (114/70 - 133/79)  BP(mean): --  RR: 15 (15 - 18)  SpO2: 98% (97% - 98%)    Affected extremity:          Dressing: clean/dry/intact, 1 HV         Pulses: Radial pulses + bilaterally         Sensation:  in tact and equal to distal bilateral upper extremities, mild b/l numbness of fingers         Motor:  Motor Strength 5/5 to /interossei/triceps/biceps/deltoid bilaterally. AIN/PIN intact.          LE Sensation: Intact distally          Motor exam: 5/5 TA/GS/EHL         warm well perfused; capillary refill <3 seconds                           14.2   6.1   )-----------( 208      ( 20 Mar 2017 09:26 )             42.3       A/P :  Pt is a 62yo Male s/p Posterior cervical Fusion C3-6  -    Pain control  -    DVT ppx: SCD     -    Weight bearing status: WBAT   -    Physical Therapy  -    Dispo: TBD

## 2017-03-24 DIAGNOSIS — M54.12 RADICULOPATHY, CERVICAL REGION: ICD-10-CM

## 2017-03-24 DIAGNOSIS — K21.9 GASTRO-ESOPHAGEAL REFLUX DISEASE WITHOUT ESOPHAGITIS: ICD-10-CM

## 2017-03-24 DIAGNOSIS — M50.81: ICD-10-CM

## 2017-03-24 DIAGNOSIS — M48.02 SPINAL STENOSIS, CERVICAL REGION: ICD-10-CM

## 2017-03-24 DIAGNOSIS — Z88.0 ALLERGY STATUS TO PENICILLIN: ICD-10-CM

## 2017-03-29 PROCEDURE — 71046 X-RAY EXAM CHEST 2 VIEWS: CPT

## 2017-03-29 PROCEDURE — 85025 COMPLETE CBC W/AUTO DIFF WBC: CPT

## 2017-03-29 PROCEDURE — 81003 URINALYSIS AUTO W/O SCOPE: CPT

## 2017-03-29 PROCEDURE — 96374 THER/PROPH/DIAG INJ IV PUSH: CPT

## 2017-03-29 PROCEDURE — 72040 X-RAY EXAM NECK SPINE 2-3 VW: CPT

## 2017-03-29 PROCEDURE — 81001 URINALYSIS AUTO W/SCOPE: CPT

## 2017-03-29 PROCEDURE — 99285 EMERGENCY DEPT VISIT HI MDM: CPT | Mod: 25

## 2017-03-29 PROCEDURE — 76000 FLUOROSCOPY <1 HR PHYS/QHP: CPT

## 2017-03-29 PROCEDURE — 80053 COMPREHEN METABOLIC PANEL: CPT

## 2017-03-29 PROCEDURE — 85610 PROTHROMBIN TIME: CPT

## 2017-03-29 PROCEDURE — 93005 ELECTROCARDIOGRAM TRACING: CPT

## 2017-03-29 PROCEDURE — 72141 MRI NECK SPINE W/O DYE: CPT

## 2017-03-29 PROCEDURE — C1889: CPT

## 2017-03-29 PROCEDURE — 36415 COLL VENOUS BLD VENIPUNCTURE: CPT

## 2017-03-29 PROCEDURE — 86850 RBC ANTIBODY SCREEN: CPT

## 2017-03-29 PROCEDURE — 85730 THROMBOPLASTIN TIME PARTIAL: CPT

## 2017-03-29 PROCEDURE — 86901 BLOOD TYPING SEROLOGIC RH(D): CPT

## 2017-03-29 PROCEDURE — 95940 IONM IN OPERATNG ROOM 15 MIN: CPT

## 2017-03-29 PROCEDURE — 86900 BLOOD TYPING SEROLOGIC ABO: CPT

## 2017-05-02 ENCOUNTER — ALLSCRIPTS OFFICE VISIT (OUTPATIENT)
Dept: OTHER | Facility: OTHER | Age: 62
End: 2017-05-02

## 2017-05-03 LAB
BASOPHILS # BLD AUTO: 0 %
BASOPHILS # BLD AUTO: 0 X10E3/UL (ref 0–0.2)
DEPRECATED RDW RBC AUTO: 14.1 % (ref 12.3–15.4)
EOSINOPHIL # BLD AUTO: 0.3 X10E3/UL (ref 0–0.4)
EOSINOPHIL # BLD AUTO: 4 %
HCT VFR BLD AUTO: 42.1 % (ref 37.5–51)
HGB BLD-MCNC: 14.4 G/DL (ref 12.6–17.7)
IMM.GRANULOCYTES (CD4/8) (HISTORICAL): 0 %
IMM.GRANULOCYTES (CD4/8) (HISTORICAL): 0 X10E3/UL (ref 0–0.1)
LYMPHOCYTES # BLD AUTO: 1.9 X10E3/UL (ref 0.7–3.1)
LYMPHOCYTES # BLD AUTO: 23 %
MCH RBC QN AUTO: 32.1 PG (ref 26.6–33)
MCHC RBC AUTO-ENTMCNC: 34.2 G/DL (ref 31.5–35.7)
MCV RBC AUTO: 94 FL (ref 79–97)
MONOCYTES # BLD AUTO: 0.7 X10E3/UL (ref 0.1–0.9)
MONOCYTES (HISTORICAL): 9 %
NEUTROPHILS # BLD AUTO: 5.1 X10E3/UL (ref 1.4–7)
NEUTROPHILS # BLD AUTO: 64 %
PLATELET # BLD AUTO: 229 X10E3/UL (ref 150–379)
RBC (HISTORICAL): 4.48 X10E6/UL (ref 4.14–5.8)
VIT B12 SERPL-MCNC: 554 PG/ML (ref 211–946)
WBC # BLD AUTO: 8 X10E3/UL (ref 3.4–10.8)

## 2017-05-04 ENCOUNTER — GENERIC CONVERSION - ENCOUNTER (OUTPATIENT)
Dept: OTHER | Facility: OTHER | Age: 62
End: 2017-05-04

## 2017-12-14 ENCOUNTER — ALLSCRIPTS OFFICE VISIT (OUTPATIENT)
Dept: OTHER | Facility: OTHER | Age: 62
End: 2017-12-14

## 2017-12-15 NOTE — PROGRESS NOTES
Assessment  1  Inclusion cyst (706 2) (L72 0)   2  Carbuncle (680 9) (L02 93)    Plan  Carbuncle, Inclusion cyst    · LevoFLOXacin 500 MG Oral Tablet; TAKE 1 TABLET DAILY    Discussion/Summary    WARM COMPRESSESMEDICATION AS DIRECTEDIF WORSE - RETURN TO OFFICEOTHERWISE RV PRN  The treatment plan was reviewed with the patient/guardian  The patient/guardian understands and agrees with the treatment plan      Chief Complaint  Patient presents with a lump in left arm daylin area, L Garcia/LPN      History of Present Illness  HPI: PT C/O LUMP IN L ARMPITINITIALLY SL TENDERX SEVERAL WEEKSSL SMALLER AT THIS TIME      Review of Systems   Constitutional: no fever-- and-- no chills  ENT: no sore throat-- and-- no nasal discharge  Cardiovascular: no chest pain,-- the heart rate was not fast,-- no palpitations-- and-- no lower extremity edema  Respiratory: no shortness of breath,-- no cough,-- no wheezing-- and-- no shortness of breath during exertion  Gastrointestinal: no abdominal pain,-- no nausea,-- no vomiting-- and-- no diarrhea  Musculoskeletal: joint stiffness, but-- no arthralgias  Integumentary: skin lesion, but-- no rashes-- and-- no skin wound  Neurological: numbness-- and-- tingling, but-- no headache-- and-- no dizziness  ROS reviewed  Active Problems  1  Heart murmur on physical examination (785 2) (R01 1)   2  Hyperlipidemia (272 4) (E78 5)   3  Peripheral polyneuropathy (356 9) (G62 9)   4  Refused influenza vaccine (V64 06) (Z28 21)   5  Seasonal allergies (477 9) (J30 2)   6  Vitamin B12 deficiency (266 2) (E53 8)   7  Vitamin D deficiency (268 9) (E55 9)    Past Medical History  1  History of Cataract, left (366 9) (H26 9)   2  History of Cervical radiculopathy (723 4) (M54 12)   3  History of Cervicalgia (723 1) (M54 2)   4  History of hyperlipidemia (V12 29) (Z86 39)   5  History of low back pain (V13 59) (Z87 39)  Active Problems And Past Medical History Reviewed:    The active problems and past medical history were reviewed and updated today  Family History  Mother    1  Family history of arthritis (V17 7) (Z82 61)   2  Family history of malignant neoplasm of breast (V16 3) (Z80 3)  Father    3  Family history of CABG   4  Family history of coronary artery disease (V17 3) (Z82 49)   5  Family history of hypertension (V17 49) (Z82 49)  Family History    6  Denied: Family history of substance abuse   7  Denied: FH: mental illness  Family History Reviewed: The family history was reviewed and updated today  Social History   · Alcohol ingestion, 1-4 drinks/week (V69 8) (Z78 9)   · Daily caffeine consumption, 1 serving a day   · Never a smoker   · Seeing a dentist  The social history was reviewed and updated today  Surgical History  1  History of Arthroscopy Knee   2  History of Shoulder Surgery   3  History of Sinus Surgery   4  History of Tonsillectomy  Surgical History Reviewed: The surgical history was reviewed and updated today  Current Meds    The medication list was reviewed and updated today  Allergies  1  Iodinated Contrast Media   2  Penicillins    Vitals   Recorded: 94FSQ1991 02:48PM   Temperature 98 2 F, Temporal   Heart Rate 64, R Radial   Pulse Quality Normal, R Radial   Respiration Quality Normal   Respiration 16   Systolic 751, LUE, Sitting   Diastolic 80, LUE, Sitting   Height 6 ft 3 in   Weight 202 lb    BMI Calculated 25 25   BSA Calculated 2 2   O2 Saturation 96     Physical Exam   Constitutional  General appearance: No acute distress, well appearing and well nourished  Eyes  Conjunctiva and lids: No swelling, erythema, or discharge  Pupils and irises: Equal, round and reactive to light  Ears, Nose, Mouth, and Throat  External inspection of ears and nose: Normal    Pulmonary  Respiratory effort: No increased work of breathing or signs of respiratory distress     Auscultation of lungs: Clear to auscultation, equal breath sounds bilaterally, no wheezes, no rales, no rhonci  Cardiovascular  Auscultation of heart: Normal rate and rhythm, normal S1 and S2, without murmurs  Examination of extremities for edema and/or varicosities: Normal    Abdomen  Abdomen: Non-tender, no masses  Liver and spleen: No hepatomegaly or splenomegaly  Lymphatic  Palpation of lymph nodes in neck: No lymphadenopathy  Musculoskeletal  Gait and station: Normal    Digits and nails: Normal without clubbing or cyanosis  Inspection/palpation of joints, bones, and muscles: Normal    Skin  Examination of the skin for lesions: Abnormal  -- L AXILLARY LUMP CONSISTENT WITH AN INCLUSION CYST, SL TENDER    Psychiatric  Orientation to person, place and time: Normal    Mood and affect: Normal          Signatures   Electronically signed by : Leonarda Hannah MD; Dec 14 2017  8:33PM EST                       (Author)

## 2018-01-11 NOTE — RESULT NOTES
Verified Results  (1) CBC/PLT/DIFF [id number] 12:00AM Bong Shake     Test Name Result Flag Reference   WBC 8 0 x10E3/uL  3 4-10 8   RBC 4 48 x10E6/uL  4 14-5 80   Hemoglobin 14 4 g/dL  12 6-17 7   Hematocrit 42 1 %  37 5-51 0   MCV 94 fL  79-97   MCH 32 1 pg  26 6-33 0   MCHC 34 2 g/dL  31 5-35 7   RDW 14 1 %  12 3-15 4   Platelets 533 E90F2/SS  150-379   Neutrophils 64 %     Lymphs 23 %     Monocytes 9 %     Eos 4 %     Basos 0 %     Neutrophils (Absolute) 5 1 x10E3/uL  1 4-7 0   Lymphs (Absolute) 1 9 x10E3/uL  0 7-3 1   Monocytes(Absolute) 0 7 x10E3/uL  0 1-0 9   Eos (Absolute) 0 3 x10E3/uL  0 0-0 4   Baso (Absolute) 0 0 x10E3/uL  0 0-0 2   Immature Granulocytes 0 %     Immature Grans (Abs) 0 0 x10E3/uL  0 0-0 1     (1) VITAMIN B12 [id number] 12:00AM Bong Shake     Test Name Result Flag Reference   Vitamin B12 554 pg/mL  211-946

## 2018-01-11 NOTE — PROGRESS NOTES
Assessment   1  Encounter for preventive health examination (V70 0) (Z00 00)  2  Hyperlipidemia (272 4) (E78 5)  3  Seasonal allergies (477 9) (J30 2)  4  Cervical radiculopathy (723 4) (M54 12)  5  Heart murmur on physical examination (785 2) (R01 1)    Plan  Cervical radiculopathy    · * XR SPINE CERVICAL COMPLETE 4 OR 5 VIEW; Status:Active; Requested  for:15Mar2016; Health Maintenance    · (1) CBC/ PLT (NO DIFF); Status: In Progress - Specimen/Data Collected;   Done:  19OJI8371   · (1) COMPREHENSIVE METABOLIC PANEL; Status: In Progress - Specimen/Data  Collected;   Done: 32OVC3978   · (1) LIPID PANEL, FASTING; Status: In Progress - Specimen/Data Collected;   Done:  01USY3861   · (1) PSA (SCREEN) (Dx V76 44 Screen for Prostate Cancer); Status: In Progress -  Specimen/Data Collected;   Done: 77VRR9019   · (1) TSH; Status: In Progress - Specimen/Data Collected;   Done: 50ISC2561   · (1) VITAMIN D 25-HYDROXY; Status: In Progress - Specimen/Data Collected;   Done:  87FFA8966   · COLONOSCOPY; Status:Active; Requested for:15Mar2016;    · EKG/ECG- POC; Status:Complete;   Done: 97UEZ4739   · 1    · Routine Venipuncture - POC; Status:Complete;   Done: 53MBN8206   · 1    · Always use a seat belt and shoulder strap when riding or driving a motor vehicle ;  Status:Complete;   Done: 02GQY5120   · Begin a limited exercise program ; Status:Complete;   Done: 47HXN3012   · Drink plenty of fluids ; Status:Complete;   Done: 47KHU3835   · Eat a low fat and low cholesterol diet ; Status:Complete;   Done: 53RUM2578   · Use a sun block product with an SPF of 15 or more ; Status:Complete;   Done:  62TOV0827   · We encourage all of our patients to exercise regularly    30 minutes of exercise or physical  activity five or more days a week is recommended for children and adults ;  Status:Complete;   Done: 32UUR5541   · We recommend routine visits to a dentist ; Status:Complete;   Done: 04QES9905   · We recommend that you bring your body mass index down to 26 ; Status:Complete;    Done: 15WSQ7187 11:03AM1    · Follow-up visit in 1 year Evaluation and Treatment  Follow-up  Status: Complete  Done:1  14EUW7188   · Gastroenterology Referral Other Physician Referral  Consult  Status: Active  Requested for:1  34FKV5162  Other : DR Edgardo Anguinao are Referring to a non- Preferred Provider : Established Patient  (MU) Care Summary provided  : Yes  Heart murmur on physical examination    · ECHO COMPLETE WITH CONTRAST IF INDICATED, TTE / TRANSTHORACIC;  Status:Need Information - Financial Authorization; Requested for:15Mar2016; Unlinked    · Stop: Ketoconazole 2 % External Shampoo     1 Amended By: Tee King; Mar 15 2016 11:03 AM EST    Discussion/Summary  Impression: health maintenance visit  Currently, he eats a healthy diet  Prostate cancer screening: prostate cancer screening is current and PSA was ordered  Testicular cancer screening: testicular cancer screening is current  Colorectal cancer screening: colorectal cancer screening is current, fecal occult blood testing is needed every year and colonoscopy has been ordered  Screening lab work includes glucose, lipid profile, 25-hydroxyvitamin D and urinalysis  The immunizations are up to date  Advice and education were given regarding sunscreen use and seat belt use  DISCUSSED HEALTH MAINTENANCE ISSUES  BW WILL BE OBTAINED  COLONOSCOPY WILL BE ORDERED  RV 1 YR FOR CPX     Chief Complaint  Patient is here today for his annual physical exam, L  Jose/VERNELL      History of Present Illness  HM, Adult Male: The patient is being seen for a health maintenance evaluation  General Health: The patient's health since the last visit is described as good  He has regular dental visits  He denies vision problems  He denies hearing loss  Immunizations status: up to date  Lifestyle:  He consumes a diverse and healthy diet  He does not have any weight concerns  He exercises regularly   He does not use tobacco  He denies alcohol use  Screening: cancer screening reviewed and current  metabolic screening reviewed and current  risk screening reviewed and current  HPI: [address] RECORD  NO MAJOR CONCERNS      Review of Systems    Constitutional: no fever, no chills and not feeling tired  Eyes: no eyesight problems  ENT: no nasal discharge  Cardiovascular: no chest pain, the heart rate was not fast, no palpitations and no extremity edema  Respiratory: no shortness of breath, no cough, no wheezing and no shortness of breath during exertion  Gastrointestinal: no abdominal pain, no nausea, no vomiting and no diarrhea  Genitourinary: no incontinence  Musculoskeletal: arthralgias and joint stiffness  Integumentary: no rashes  Neurological: numbness and tingling, but no headache and no dizziness  Psychiatric: no anxiety and no depression  Endocrine: no muscle weakness  Hematologic/Lymphatic: no swollen glands  ROS reviewed  Active Problems   1  Hyperlipidemia (272 4) (E78 5)  2  Seasonal allergies (477 9) (J30 2)    Past Medical History    · History of Cervicalgia (723 1) (M54 2)   · History of hyperlipidemia (V12 29) (Z86 39)   · History of low back pain (V13 59) (Z87 39)    Surgical History    · History of Arthroscopy Knee   · History of Shoulder Surgery   · History of Sinus Surgery   · History of Tonsillectomy    Family History    · Family history of arthritis (V17 7) (Z82 61)   · Family history of malignant neoplasm of breast (V16 3) (Z80 3)    · Family history of CABG   · Family history of coronary artery disease (V17 3) (Z82 49)   · Family history of hypertension (V17 49) (Z82 49)    · Denied: FH: mental illness    Social History    · Alcohol ingestion, 1-4 drinks/week (V69 8) (Z72 89)   · Daily caffeine consumption, 1 serving a day   · Never a smoker   · Seeing a dentist    Current Meds  1  Ketoconazole 2 % External Shampoo; Therapy: [id number] to Recorded  2   No Reported Medications Recorded    Allergies   1  Iodinated Contrast Media  2  Penicillins    Vitals   Recorded: 07BEM3946 08:31AM   Temperature 97 3 F, Tympanic   Heart Rate 60, L Radial   Pulse Quality Normal, L Radial   Respiration 16   Respiration Quality Normal   Systolic 513, LUE, Sitting   Diastolic 70, LUE, Sitting   Height 6 ft 3 in   Weight 213 lb    BMI Calculated 26 62   BSA Calculated 2 25     Physical Exam    Constitutional   General appearance: No acute distress, well appearing and well nourished  Head and Face   Head and face: Normal     Palpation of the face and sinuses: No sinus tenderness  Eyes   Conjunctiva and lids: No erythema, swelling or discharge  Pupils and irises: Equal, round, reactive to light  Ophthalmoscopic examination: Normal fundi and optic discs  Ears, Nose, Mouth, and Throat   External inspection of ears and nose: Normal     Otoscopic examination: Tympanic membranes translucent with normal light reflex  Canals patent without erythema  Nasal mucosa, septum, and turbinates: Normal without edema or erythema  Lips, teeth, and gums: Normal, good dentition  Oropharynx: Normal with no erythema, edema, exudate or lesions  Neck   Neck: Supple, symmetric, trachea midline, no masses  Thyroid: Normal, no thyromegaly  Pulmonary   Respiratory effort: No increased work of breathing or signs of respiratory distress  Auscultation of lungs: Clear to auscultation  Cardiovascular   Auscultation of heart: Abnormal   EARLY SOFT PATRICIA 1/6  Carotid pulses: 2+ bilaterally  Abdominal aorta: Normal     Femoral pulses: 2+ bilaterally  Pedal pulses: 2+ bilaterally  Peripheral vascular exam: Normal     Examination of extremities for edema and/or varicosities: Normal     Abdomen   Abdomen: Non-tender, no masses  Liver and spleen: No hepatomegaly or splenomegaly  Examination for hernias: No hernias appreciated      Anus, perineum, and rectum: Normal sphincter tone, no masses, no prolapse  Stool sample for occult blood: Negative  STOOL HEME NEG  Genitourinary   Scrotal contents: Normal testes, no masses  Penis: Normal, no lesions  Digital rectal exam of prostate: Normal size, no masses  Lymphatic   Palpation of lymph nodes in neck: No lymphadenopathy  Palpation of lymph nodes in axillae: No lymphadenopathy  Palpation of lymph nodes in groin: No lymphadenopathy  Musculoskeletal   Gait and station: Normal     Inspection/palpation of digits and nails: Normal without clubbing or cyanosis  Inspection/palpation of joints, bones, and muscles: Normal     Range of motion: Normal     Stability: Normal     Muscle strength/tone: Normal     Skin   Skin and subcutaneous tissue: Normal without rashes or lesions  Palpation of skin and subcutaneous tissue: Normal turgor  Neurologic   Cranial nerves: Cranial nerves 2-12 intact  Cortical function: Normal mental status  Reflexes: 2+ and symmetric  Sensation: No sensory loss  Coordination: Normal finger to nose and heel to shin      Psychiatric   Judgment and insight: Normal     Orientation to person, place and time: Normal     Mood and affect: Normal        Results/Data  Hemoccult Screening - POC [id number] 09:05AM Silver Quiver     Test Name Result Flag Reference   Hemoccult Negative       Urine Dip Automated- POC [id number] 08:50AM Silver Quiver     Test Name Result Flag Reference   Color Yellow     Clarity Transparent     Leukocytes negative     Nitrite negative     Blood negative     Bilirubin negative     Urobilinogen normal     Protein negative     Ph 5     Specific Gravity 1 030     Ketone negative     Glucose normal       PHQ-2 Adult Depression Screening [id number] 08:43AM User, Ahs     Test Name Result Flag Reference   PHQ-2 Adult Depression Score 0     Q1: 0, Q2: 0   PHQ-2 Adult Depression Screening Negative       Falls Risk Assessment (Dx V80 09 Screen for Neurologic Disorder) 14RCV2813 08: 43AM User, s     Test Name Result Flag Reference   Falls Risk      No falls in the past year       Signatures   Electronically signed by : Estefany Anna MD; Mar 15 2016 11:03AM EST                       (Author)

## 2018-01-12 NOTE — RESULT NOTES
Message   PLEASE CALL   REVIEWED BW RESULTS   CHOL IS A BIT HIGH AND VIT D WAS LOW   LETS HAVE AN OV TO DISCUSS   THANKS   PLEASE CALL   REVIEWED BW   CHOL WAS ELEVATED AND VIT D WAS LOW   LETS HAVE AN OV TO DISCUSS     Verified Results  (1) COMPREHENSIVE METABOLIC PANEL [id number] 31:45ZY Evonne Donahue     Test Name Result Flag Reference   Glucose, Serum 104 mg/dL H 65-99   BUN 14 mg/dL  6-24   Creatinine, Serum 0 96 mg/dL  0 76-1 27   eGFR If NonAfricn Am 86 mL/min/1 73  >59   eGFR If Africn Am 100 mL/min/1 73  >59   BUN/Creatinine Ratio 15  9-20   Sodium, Serum 141 mmol/L  134-144   Potassium, Serum 4 5 mmol/L  3 5-5 2   Chloride, Serum 105 mmol/L     Carbon Dioxide, Total 23 mmol/L  18-29   Calcium, Serum 9 3 mg/dL  8 7-10 2   Protein, Total, Serum 6 6 g/dL  6 0-8 5   Albumin, Serum 4 3 g/dL  3 5-5 5   Globulin, Total 2 3 g/dL  1 5-4 5   A/G Ratio 1 9  1 1-2 5   Bilirubin, Total 0 5 mg/dL  0 0-1 2   Alkaline Phosphatase, S 37 IU/L L    AST (SGOT) 17 IU/L  0-40   ALT (SGPT) 23 IU/L  0-44     (1) LIPID PANEL, FASTING [id number] 12:00AM Evonne Donahue     Test Name Result Flag Reference   Cholesterol, Total 241 mg/dL H 100-199   Triglycerides 121 mg/dL  0-149   HDL Cholesterol 61 mg/dL  >39   According to ATP-III Guidelines, HDL-C >59 mg/dL is considered a  negative risk factor for CHD  VLDL Cholesterol Miah 24 mg/dL  5-40   LDL Cholesterol Calc 156 mg/dL H 0-99   T  Chol/HDL Ratio 4 0 ratio units  0 0-5 0   T  Chol/HDL Ratio                                                             Men  Women                                               1/2 Avg  Risk  3 4    3 3                                                   Avg Risk  5 0    4 4                                                2X Avg  Risk  9 6    7 1                                                3X Avg  Risk 23 4   11 0

## 2018-01-13 VITALS
SYSTOLIC BLOOD PRESSURE: 130 MMHG | TEMPERATURE: 98.1 F | HEIGHT: 75 IN | DIASTOLIC BLOOD PRESSURE: 80 MMHG | HEART RATE: 90 BPM | WEIGHT: 206 LBS | RESPIRATION RATE: 16 BRPM | OXYGEN SATURATION: 98 % | BODY MASS INDEX: 25.61 KG/M2

## 2018-01-15 NOTE — RESULT NOTES
Verified Results  (1) LIPID PANEL, FASTING [id number] 12:00AM Jomarie Saint     Test Name Result Flag Reference   Cholesterol, Total 198 mg/dL  100-199   Triglycerides 101 mg/dL  0-149   HDL Cholesterol 63 mg/dL  >39   According to ATP-III Guidelines, HDL-C >59 mg/dL is considered a  negative risk factor for CHD  VLDL Cholesterol Miah 20 mg/dL  5-40   LDL Cholesterol Calc 115 mg/dL H 0-99   T  Chol/HDL Ratio 3 1 ratio units  0 0-5 0   T  Chol/HDL Ratio                                                             Men  Women                                               1/2 Avg  Risk  3 4    3 3                                                   Avg Risk  5 0    4 4                                                2X Avg  Risk  9 6    7 1                                                3X Avg  Risk 23 4   11 0       Discussion/Summary   ARIAN   BW WAS GOOD   CHOL 198 WITH GOOD CHOL   KEEP UP THE GOOD WORK   DR BRUNO

## 2018-01-16 NOTE — RESULT NOTES
Message   PLEASE CALL NATHAN   X RAY REPORT SHOWS SOME ARTHRITIS AND DISC NARROWING WITH INDIRECT EVIDENCE OF NERVE COMPREESION , ULYSSES ON THE LEFT      I THINK OUR NEXT STEP IS CONSIDERING SOME PHYSICAL THERAPY TO HELP   IF THAT IS OK, I WILL PUT AN ORDER IN   THANKS     Verified Results  * XR SPINE CERVICAL COMPLETE 4 OR 5 VIEW 28Mar2016 11:04AM Ally Potts Order Number: [id number]     Test Name Result Flag Reference   XR SPINE CERVICAL COMPLETE 4 OR 5 VW (Report)     CERVICAL SPINE     INDICATION: Radiculopathy  COMPARISON: None     VIEWS: 5; 6 images     FINDINGS:     No evidence of fracture or subluxation  Loss of the normal cervical lordosis likely due to muscle spasm  Disc space narrowing from C4 through C7  Neural foraminal narrowing on the right at the C5/C6 and C6/C7 levels and on the left at the C4/C5 and C5/C6 levels  The prevertebral soft tissues are within normal limits  The lung apices are intact  IMPRESSION:     Degenerative changes         Workstation performed: [id number]     Signed by:   Latonia Simpson MD   3/28/16

## 2018-01-23 VITALS
HEIGHT: 75 IN | SYSTOLIC BLOOD PRESSURE: 124 MMHG | TEMPERATURE: 98.2 F | HEART RATE: 64 BPM | DIASTOLIC BLOOD PRESSURE: 80 MMHG | BODY MASS INDEX: 25.12 KG/M2 | OXYGEN SATURATION: 96 % | WEIGHT: 202 LBS | RESPIRATION RATE: 16 BRPM

## 2018-02-14 PROBLEM — M48.00 SPINAL STENOSIS, SITE UNSPECIFIED: Chronic | Status: ACTIVE | Noted: 2017-03-20

## 2018-02-21 ENCOUNTER — OFFICE VISIT (OUTPATIENT)
Dept: ORTHOPEDIC SURGERY | Facility: CLINIC | Age: 63
End: 2018-02-21

## 2018-02-21 VITALS
OXYGEN SATURATION: 96 % | TEMPERATURE: 97.9 F | BODY MASS INDEX: 24.2 KG/M2 | HEIGHT: 75 IN | HEART RATE: 58 BPM | SYSTOLIC BLOOD PRESSURE: 130 MMHG | WEIGHT: 194.6 LBS | DIASTOLIC BLOOD PRESSURE: 83 MMHG

## 2018-02-21 DIAGNOSIS — M70.61 TROCHANTERIC BURSITIS OF RIGHT HIP: ICD-10-CM

## 2018-02-21 DIAGNOSIS — M25.551 BILATERAL HIP PAIN: Primary | ICD-10-CM

## 2018-02-21 DIAGNOSIS — M25.552 BILATERAL HIP PAIN: Primary | ICD-10-CM

## 2018-02-21 RX ORDER — MELOXICAM 15 MG/1
15 TABLET ORAL DAILY
Qty: 30 TAB | Refills: 2 | Status: SHIPPED | OUTPATIENT
Start: 2018-02-21

## 2018-02-21 RX ORDER — BETAMETHASONE SODIUM PHOSPHATE AND BETAMETHASONE ACETATE 3; 3 MG/ML; MG/ML
6 INJECTION, SUSPENSION INTRA-ARTICULAR; INTRALESIONAL; INTRAMUSCULAR; SOFT TISSUE ONCE
Qty: 1 ML | Refills: 0
Start: 2018-02-21 | End: 2018-02-21

## 2018-02-21 NOTE — PROGRESS NOTES
Patient: Sana Perez                MRN: [id number]       SSN: [id number]  YOB: 1955        AGE: 65 y.o. SEX: male  Body mass index is 24.32 kg/(m^2). PCP: No primary care provider on file. 02/21/18  HISTORY: Mr. 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 He stays very active. He exercises, works out, and lives on a boat. He travels with the weather. He is complaining of right lateral hip pain. He saw an orthopedic surgeon previously. He has had a history of neck problems and back problems as well. He denies groin pain and denies start-up pain. It mainly just hurts when he rolls over on it at night. He has had no trauma. He was on steroids for a foot problem for about six weeks but no continuous problems. He denies any heavy alcohol consumption. He is otherwise feeling very well. He is able to do yoga, walk, swim, and do most activities. Rolling over on the hip at night hurts him. PHYSICAL EXAMINATION:  On examination today, he is a tall, slim gentleman. He walks completely normally with no antalgic component to the gait whatsoever. He moves his head and neck adequately. There is no respiratory compromise or indrawing. There is no scleral icterus or JVD. The hips rotate nicely, including flexion, adduction, and IR. The low back is only minimally tender. The lateral femoral cutaneous nerve is intact and distally, L4-5 is normal.   Tib/ant and EHL are 5/5. Straight leg raise is negative. 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 It is a little bit worse on the left than on the right. He is more symptomatic on the right today. IMPRESSION:  My overall impression is bursitis of the hip.      PROCEDURE:  Under aseptic conditions and after informed, written consent with a time out, the right trochanteric bursa was injected with 1 cc of the Celestone preparation, i.e. 6 mg, which was well tolerated. PLAN:  We are going to give him some anti-inflammatories, and he requested an injection today. We will have him return to see us in a few weeks time. We may consider some physical therapy at that point. If he is not happy or develops more groin discomfort, we could consider an MRI evaluation. This should do very well for him. It has been a pleasure to share in his care. REVIEW OF SYSTEMS:      CON: negative for weight loss, fever  EYE: negative for double vision  ENT: negative for hoarseness  RS:   negative for Tb  GI:    negative for blood in stool  :  negative for blood in urine  Other systems reviewed and noted below. Past Medical History:   Diagnosis Date    Arthritis        Family History   Problem Relation Age of Onset    Cancer Mother     Arthritis-osteo Mother     Heart Disease Father        Current Outpatient Prescriptions   Medication Sig Dispense Refill    betamethasone (CELESTONE SOLUSPAN) 6 mg/mL injection 1 mL by Intra artICUlar route once for 1 dose. 1 mL 0    meloxicam (MOBIC) 15 mg tablet Take 1 Tab by mouth daily. 30 Tab 2       Allergies   Allergen Reactions    Iodine Hives    Penicillins Hives       Past Surgical History:   Procedure Laterality Date    SPINE SURGERY PROCEDURE UNLISTED         Social History     Social History    Marital status:      Spouse name: N/A    Number of children: N/A    Years of education: N/A     Occupational History    Not on file.      Social History Main Topics    Smoking status: Never Smoker    Smokeless tobacco: Never Used    Alcohol use 1.8 oz/week     3 Glasses of wine per week      Comment: occasionally    Drug use: Not on file    Sexual activity: Not on file     Other Topics Concern    Not on file     Social History Narrative    No narrative on file       Visit Vitals    /83    Pulse (!) 58    Temp 97.9 °F (36.6 °C) (Oral)    Ht 6' 3\" (1.905 m)    Wt 194 lb 9.6 oz (88.3 kg)    SpO2 96%    BMI 24.32 kg/m2         PHYSICAL EXAMINATION:  GENERAL: Alert and oriented x3, in no acute distress, well-developed, well-nourished, afebrile. HEART: No JVD. EYES: No scleral icterus   NECK: No significant lymphadenopathy   LUNGS: No respiratory compromise or indrawing  ABDOMEN: Soft, non-tender, non-distended. Electronically signed by:  Peggy Jacob MD

## 2018-03-15 ENCOUNTER — OFFICE VISIT (OUTPATIENT)
Dept: ORTHOPEDIC SURGERY | Facility: CLINIC | Age: 63
End: 2018-03-15

## 2018-03-15 VITALS
SYSTOLIC BLOOD PRESSURE: 119 MMHG | RESPIRATION RATE: 18 BRPM | OXYGEN SATURATION: 99 % | TEMPERATURE: 96.6 F | WEIGHT: 195.4 LBS | HEART RATE: 67 BPM | DIASTOLIC BLOOD PRESSURE: 66 MMHG | HEIGHT: 75 IN | BODY MASS INDEX: 24.29 KG/M2

## 2018-03-15 DIAGNOSIS — M70.61 TROCHANTERIC BURSITIS OF RIGHT HIP: ICD-10-CM

## 2018-03-15 DIAGNOSIS — M16.11 PRIMARY OSTEOARTHRITIS OF RIGHT HIP: Primary | ICD-10-CM

## 2018-03-15 NOTE — PROGRESS NOTES
Patient: Saray Montenegro                MRN: [id number]       SSN: [id number]  YOB: 1955        AGE: 65 y.o. SEX: male  Body mass index is 24.42 kg/(m^2). PCP: No primary care provider on file.  03/15/18    HISTORY: Mr. Ganga Pickett returns in follow up with regards to right hip pain, laterally-based. It hurts to roll over on it at night. He is still able to go to the gym. We gave him an injection. It really did not help. He gets some deep-seated pain that is medial to the trochanter and groin as well. He has no true catching or locking, but it does irritate him, and there are certain activities at the gym that do bother him. For straightaway exercises, he is not bothered by it. He denies numbness or tingling. He has had some neck problems in the past as well. He has no radiculopathy and no balance problems. PHYSICAL EXAMINATION:  On examination today, he is a very nice gentleman. He is slim. He appears younger than his stated age. The low back is minimally tender. He moves his neck actually fairly well today. He has good  strength in the hand and good pulses. The hips with flexion, adduction, and internal rotation, there is some mild discomfort laterally and medially. The hip flexor is not particularly tender. He is neurologically intact. The calf is nontender. Cristina's sign is negative. There is no antalgic component to the gait. His hip is still waking him up at night as well considerably. RADIOGRAPHS:  Review of his x-rays reveals some cam impingement and some mild dysplasia with only minimal arthritis. PLAN:  I think we should obtain an MRI. I would have expected a much better result from the anti-inflammatory medications and injection. We will also make sure he does not have any avascular necrosis as well in addition to his impingement syndrome. We will see him back after the MRI evaluation.   We may consider some physical therapy for him if appropriate. REVIEW OF SYSTEMS:      CON: negative for weight loss, fever  EYE: negative for double vision  ENT: negative for hoarseness  RS:   negative for Tb  GI:    negative for blood in stool  :  negative for blood in urine  Other systems reviewed and noted below. Past Medical History:   Diagnosis Date    Arthritis        Family History   Problem Relation Age of Onset    Cancer Mother     Arthritis-osteo Mother     Heart Disease Father        Current Outpatient Prescriptions   Medication Sig Dispense Refill    meloxicam (MOBIC) 15 mg tablet Take 1 Tab by mouth daily. 30 Tab 2       Allergies   Allergen Reactions    Iodine Hives    Penicillins Hives       Past Surgical History:   Procedure Laterality Date    SPINE SURGERY PROCEDURE UNLISTED         Social History     Social History    Marital status:      Spouse name: N/A    Number of children: N/A    Years of education: N/A     Occupational History    Not on file. Social History Main Topics    Smoking status: Never Smoker    Smokeless tobacco: Never Used    Alcohol use 1.8 oz/week     3 Glasses of wine per week      Comment: occasionally    Drug use: No    Sexual activity: Not on file     Other Topics Concern    Not on file     Social History Narrative       Visit Vitals    /66    Pulse 67    Temp 96.6 °F (35.9 °C) (Oral)    Resp 18    Ht 6' 3\" (1.905 m)    Wt 88.6 kg (195 lb 6.4 oz)    SpO2 99%    BMI 24.42 kg/m2         PHYSICAL EXAMINATION:  GENERAL: Alert and oriented x3, in no acute distress, well-developed, well-nourished, afebrile. HEART: No JVD. EYES: No scleral icterus   NECK: No significant lymphadenopathy   LUNGS: No respiratory compromise or indrawing  ABDOMEN: Soft, non-tender, non-distended. Electronically signed by:  Gigi Haywood MD

## 2018-03-30 ENCOUNTER — HOSPITAL ENCOUNTER (OUTPATIENT)
Dept: MRI IMAGING | Age: 63
Discharge: HOME | End: 2018-03-30
Attending: ORTHOPAEDIC SURGERY
Payer: COMMERCIAL

## 2018-03-30 DIAGNOSIS — M16.11 PRIMARY OSTEOARTHRITIS OF RIGHT HIP: ICD-10-CM

## 2018-03-30 PROCEDURE — 73721 MRI JNT OF LWR EXTRE W/O DYE: CPT

## 2018-04-13 ENCOUNTER — OFFICE VISIT (OUTPATIENT)
Dept: ORTHOPEDIC SURGERY | Age: 63
End: 2018-04-13

## 2018-04-13 VITALS
HEIGHT: 75 IN | SYSTOLIC BLOOD PRESSURE: 120 MMHG | HEART RATE: 53 BPM | DIASTOLIC BLOOD PRESSURE: 78 MMHG | BODY MASS INDEX: 24.99 KG/M2 | WEIGHT: 201 LBS | RESPIRATION RATE: 14 BRPM | OXYGEN SATURATION: 98 % | TEMPERATURE: 97.2 F

## 2018-04-13 DIAGNOSIS — M70.61 TROCHANTERIC BURSITIS OF RIGHT HIP: Primary | ICD-10-CM

## 2018-04-13 DIAGNOSIS — M16.11 PRIMARY OSTEOARTHRITIS OF RIGHT HIP: ICD-10-CM

## 2018-06-01 ENCOUNTER — OFFICE VISIT (OUTPATIENT)
Dept: FAMILY MEDICINE CLINIC | Facility: CLINIC | Age: 63
End: 2018-06-01
Payer: COMMERCIAL

## 2018-06-01 VITALS
HEIGHT: 75 IN | HEART RATE: 50 BPM | TEMPERATURE: 98.3 F | RESPIRATION RATE: 18 BRPM | DIASTOLIC BLOOD PRESSURE: 80 MMHG | OXYGEN SATURATION: 97 % | BODY MASS INDEX: 24.79 KG/M2 | SYSTOLIC BLOOD PRESSURE: 124 MMHG | WEIGHT: 199.4 LBS

## 2018-06-01 DIAGNOSIS — Z13.29 SCREENING FOR HYPOTHYROIDISM: ICD-10-CM

## 2018-06-01 DIAGNOSIS — E55.9 VITAMIN D DEFICIENCY: ICD-10-CM

## 2018-06-01 DIAGNOSIS — Z13.6 SCREENING FOR HYPERTENSION: ICD-10-CM

## 2018-06-01 DIAGNOSIS — E53.8 VITAMIN B12 DEFICIENCY: ICD-10-CM

## 2018-06-01 DIAGNOSIS — Z12.5 ENCOUNTER FOR PROSTATE CANCER SCREENING: ICD-10-CM

## 2018-06-01 DIAGNOSIS — Z00.00 ROUTINE GENERAL MEDICAL EXAMINATION AT A HEALTH CARE FACILITY: Primary | ICD-10-CM

## 2018-06-01 DIAGNOSIS — Z12.11 COLON CANCER SCREENING: ICD-10-CM

## 2018-06-01 DIAGNOSIS — J30.1 SEASONAL ALLERGIC RHINITIS DUE TO POLLEN: ICD-10-CM

## 2018-06-01 DIAGNOSIS — E78.01 FAMILIAL HYPERCHOLESTEROLEMIA: ICD-10-CM

## 2018-06-01 DIAGNOSIS — G62.9 PERIPHERAL POLYNEUROPATHY: ICD-10-CM

## 2018-06-01 LAB
SL AMB  POCT GLUCOSE, UA: NORMAL
SL AMB LEUKOCYTE ESTERASE,UA: NEGATIVE
SL AMB POCT BILIRUBIN,UA: NEGATIVE
SL AMB POCT BLOOD,UA: NEGATIVE
SL AMB POCT CLARITY,UA: CLEAR
SL AMB POCT COLOR,UA: YELLOW
SL AMB POCT FECES OCC BLD: NORMAL
SL AMB POCT KETONES,UA: NEGATIVE
SL AMB POCT NITRITE,UA: NEGATIVE
SL AMB POCT PH,UA: 7
SL AMB POCT SPECIFIC GRAVITY,UA: 1.01
SL AMB POCT URINE PROTEIN: NEGATIVE
SL AMB POCT UROBILINOGEN: NORMAL

## 2018-06-01 PROCEDURE — 93000 ELECTROCARDIOGRAM COMPLETE: CPT | Performed by: FAMILY MEDICINE

## 2018-06-01 PROCEDURE — 81003 URINALYSIS AUTO W/O SCOPE: CPT | Performed by: FAMILY MEDICINE

## 2018-06-01 PROCEDURE — 36415 COLL VENOUS BLD VENIPUNCTURE: CPT | Performed by: FAMILY MEDICINE

## 2018-06-01 PROCEDURE — 82270 OCCULT BLOOD FECES: CPT | Performed by: FAMILY MEDICINE

## 2018-06-01 PROCEDURE — 99396 PREV VISIT EST AGE 40-64: CPT | Performed by: FAMILY MEDICINE

## 2018-06-01 RX ORDER — MELOXICAM 15 MG/1
TABLET ORAL
COMMUNITY
Start: 2018-03-20 | End: 2018-06-01 | Stop reason: ALTCHOICE

## 2018-06-01 NOTE — PROGRESS NOTES
FAMILY [facility] HEALTH MAINTENANCE OFFICE VISIT  [facility] - [address] PHYSICIANS    NAME: Chandrakant Fitzpatrick  AGE: 65 y o  SEX: male  : 1955     DATE: 2018    Assessment and Plan     Problem List Items Addressed This Visit     Hyperlipidemia    Relevant Orders    Lipid panel    TSH, 3rd generation with T4 reflex    Peripheral polyneuropathy    Relevant Orders    CBC and differential    Comprehensive metabolic panel    Vitamin B12    Seasonal allergies    Vitamin B12 deficiency    Relevant Orders    Vitamin B12    Vitamin D deficiency    Relevant Orders    Vitamin D 25 hydroxy    Colon cancer screening    Relevant Orders    POCT hemoccult screening    Encounter for prostate cancer screening    Relevant Orders    PSA, total and free    Screening for hypothyroidism    Relevant Orders    Lipid panel    TSH, 3rd generation with T4 reflex    Screening for hypertension    Relevant Orders    Comprehensive metabolic panel    POCT ECG    POCT urine dip auto non-scope    Routine general medical examination at a health care facility - Primary    Relevant Orders    CBC and differential    Comprehensive metabolic panel    Lipid panel    TSH, 3rd generation with T4 reflex    PSA, total and free    POCT ECG    POCT hemoccult screening    POCT urine dip auto non-scope    Vitamin D 25 hydroxy    Vitamin B12               Return in about 6 months (around 2018) for Recheck  Chief Complaint   No chief complaint on file        History of Present Illness     DISCUSSED HEALTH ISSUES  REVIEWED MEDICAL RECORD  NO CONCERNS AT THIS TIME        Well Adult Physical   Patient here for a comprehensive physical exam       Diet and Physical Activity  Diet: well balanced diet  Weight concerns: None, patient's BMI is between 18 5-24 9  Exercise: frequently      Depression Screen  PHQ-9 Depression Screening    PHQ-9:    Frequency of the following problems over the past two weeks:               General Health  Hearing: Normal:  bilateral  Vision: no vision problems  Dental: regular dental visits    Reproductive Health          The following portions of the patient's history were reviewed and updated as appropriate: allergies, current medications, past family history, past medical history, past social history, past surgical history and problem list     Review of Systems     Review of Systems   Constitutional: Negative for chills, fatigue and fever  HENT: Negative for congestion, ear discharge, ear pain, mouth sores, postnasal drip, sore throat and trouble swallowing  Eyes: Negative for pain, discharge and visual disturbance  Respiratory: Negative for cough, shortness of breath and wheezing  Cardiovascular: Negative for chest pain, palpitations and leg swelling  Gastrointestinal: Negative for abdominal distention, abdominal pain, blood in stool, diarrhea and nausea  Endocrine: Negative for polydipsia, polyphagia and polyuria  Genitourinary: Negative for dysuria, frequency, hematuria and urgency  Musculoskeletal: Negative for arthralgias, gait problem and joint swelling  Skin: Negative for pallor and rash  Neurological: Negative for dizziness, syncope, speech difficulty, weakness, light-headedness, numbness and headaches  Hematological: Negative for adenopathy  Psychiatric/Behavioral: Negative for behavioral problems, confusion and sleep disturbance  The patient is not nervous/anxious          Past Medical History     Past Medical History:   Diagnosis Date    Cataract, left     Last Assessed 11/29/2016    Cervical radiculopathy     Last Assessed 05/02/2017    Cervicalgia     Last Assessed 09/08/2014    Hyperlipidemia     Last Assessed 09/08/2014       Past Surgical History     Past Surgical History:   Procedure Laterality Date    ARTHROSCOPY KNEE      Last Assessed 09/08/2015   Rafael Cora SHOULDER SURGERY      Last Assessed 09/08/2014   Rafael Cora SINUS SURGERY      Last Assessed 09/08/2014    TONSILLECTOMY      Last Assessed 09/08/2014       Social History     Social History     Social History    Marital status: /Civil Union     Spouse name: N/A    Number of children: N/A    Years of education: N/A     Social History Main Topics    Smoking status: Never Smoker    Smokeless tobacco: Not on file    Alcohol use 0 6 - 2 4 oz/week     1 - 4 Standard drinks or equivalent per week      Comment: drinks/week    Drug use: Unknown    Sexual activity: Not on file     Other Topics Concern    Not on file     Social History Narrative    Daily Caffeine consumption 1 serving a day    Seeing a dentist       Family History     Family History   Problem Relation Age of Onset    Arthritis Mother     Breast cancer Mother     Coronary artery disease Father      CABG    Hypertension Father        Current Medications       Current Outpatient Prescriptions:     meloxicam (MOBIC) 15 mg tablet, , Disp: , Rfl:      Allergies     Allergies   Allergen Reactions    Iodinated Diagnostic Agents     Penicillins        Objective     There were no vitals taken for this visit  Physical Exam   Constitutional: He is oriented to person, place, and time  He appears well-developed and well-nourished  HENT:   Head: Normocephalic and atraumatic  Right Ear: External ear normal    Left Ear: External ear normal    Nose: Nose normal    Mouth/Throat: Oropharynx is clear and moist    Eyes: Conjunctivae and EOM are normal  Pupils are equal, round, and reactive to light  Right eye exhibits no discharge  Left eye exhibits no discharge  Neck: Neck supple  No JVD present  No thyromegaly present  Cardiovascular: Normal rate, regular rhythm, normal heart sounds and intact distal pulses  No murmur heard  Pulmonary/Chest: Effort normal and breath sounds normal  He has no wheezes  He has no rales  Abdominal: Soft  Bowel sounds are normal  He exhibits no mass  There is no hepatosplenomegaly  There is no tenderness   There is no rebound, no guarding and no CVA tenderness  Genitourinary: Rectum normal, prostate normal and penis normal  Rectal exam shows guaiac negative stool  Musculoskeletal: Normal range of motion  He exhibits no edema, tenderness or deformity  Lymphadenopathy:     He has no cervical adenopathy  He has no axillary adenopathy  Neurological: He is alert and oriented to person, place, and time  He has normal reflexes  No cranial nerve deficit  He exhibits normal muscle tone  Coordination normal    Skin: Skin is warm and dry  No rash noted  No erythema  Psychiatric: He has a normal mood and affect  His behavior is normal  Judgment and thought content normal          No exam data present    Health Maintenance     There are no preventive care reminders to display for this patient    Immunization History   Administered Date(s) Administered    Influenza Split Preservative Free ID 10/08/2014, 10/30/2015    Tdap 10/01/2014       Jessica Tello MD  [facility]

## 2018-06-01 NOTE — PATIENT INSTRUCTIONS
DISCUSSED HEALTH MAINTENENCE ISSUES  BW WILL BE OBTAINED  ENCOURAGED HEALTHY DIET AND EXERCISE  COLONOSCOPY WILL BE ORDERED  RV FOR ANNUAL HEALTH EXAM IN 1 YEAR  RV SOONER IF THERE ARE ANY CONCERNS

## 2018-06-02 LAB
25(OH)D3+25(OH)D2 SERPL-MCNC: 32.5 NG/ML (ref 30–100)
ALBUMIN SERPL-MCNC: 4.6 G/DL (ref 3.6–4.8)
ALBUMIN/GLOB SERPL: 1.9 {RATIO} (ref 1.2–2.2)
ALP SERPL-CCNC: 40 IU/L (ref 39–117)
ALT SERPL-CCNC: 13 IU/L (ref 0–44)
AST SERPL-CCNC: 16 IU/L (ref 0–40)
BASOPHILS # BLD AUTO: 0 X10E3/UL (ref 0–0.2)
BASOPHILS NFR BLD AUTO: 0 %
BILIRUB SERPL-MCNC: 0.4 MG/DL (ref 0–1.2)
BUN SERPL-MCNC: 17 MG/DL (ref 8–27)
BUN/CREAT SERPL: 14 (ref 10–24)
CALCIUM SERPL-MCNC: 9.6 MG/DL (ref 8.6–10.2)
CHLORIDE SERPL-SCNC: 99 MMOL/L (ref 96–106)
CHOLEST SERPL-MCNC: 206 MG/DL (ref 100–199)
CHOLEST/HDLC SERPL: 3 RATIO (ref 0–5)
CO2 SERPL-SCNC: 25 MMOL/L (ref 18–29)
CREAT SERPL-MCNC: 1.19 MG/DL (ref 0.76–1.27)
EOSINOPHIL # BLD AUTO: 0.2 X10E3/UL (ref 0–0.4)
EOSINOPHIL NFR BLD AUTO: 2 %
ERYTHROCYTE [DISTWIDTH] IN BLOOD BY AUTOMATED COUNT: 13.8 % (ref 12.3–15.4)
GLOBULIN SER-MCNC: 2.4 G/DL (ref 1.5–4.5)
GLUCOSE SERPL-MCNC: 89 MG/DL (ref 65–99)
HCT VFR BLD AUTO: 45.8 % (ref 37.5–51)
HDLC SERPL-MCNC: 68 MG/DL
HGB BLD-MCNC: 15.2 G/DL (ref 13–17.7)
IMM GRANULOCYTES # BLD: 0 X10E3/UL (ref 0–0.1)
IMM GRANULOCYTES NFR BLD: 0 %
LDLC SERPL CALC-MCNC: 115 MG/DL (ref 0–99)
LYMPHOCYTES # BLD AUTO: 2 X10E3/UL (ref 0.7–3.1)
LYMPHOCYTES NFR BLD AUTO: 26 %
MCH RBC QN AUTO: 31.7 PG (ref 26.6–33)
MCHC RBC AUTO-ENTMCNC: 33.2 G/DL (ref 31.5–35.7)
MCV RBC AUTO: 96 FL (ref 79–97)
MONOCYTES # BLD AUTO: 0.7 X10E3/UL (ref 0.1–0.9)
MONOCYTES NFR BLD AUTO: 9 %
NEUTROPHILS # BLD AUTO: 4.7 X10E3/UL (ref 1.4–7)
NEUTROPHILS NFR BLD AUTO: 63 %
PLATELET # BLD AUTO: 221 X10E3/UL (ref 150–379)
POTASSIUM SERPL-SCNC: 4.5 MMOL/L (ref 3.5–5.2)
PROT SERPL-MCNC: 7 G/DL (ref 6–8.5)
RBC # BLD AUTO: 4.79 X10E6/UL (ref 4.14–5.8)
SL AMB EGFR AFRICAN AMERICAN: 76 ML/MIN/1.73
SL AMB EGFR NON AFRICAN AMERICAN: 66 ML/MIN/1.73
SL AMB VLDL CHOLESTEROL CALC: 23 MG/DL (ref 5–40)
SODIUM SERPL-SCNC: 140 MMOL/L (ref 134–144)
TRIGL SERPL-MCNC: 116 MG/DL (ref 0–149)
TSH SERPL DL<=0.005 MIU/L-ACNC: 0.86 UIU/ML (ref 0.45–4.5)
VIT B12 SERPL-MCNC: 441 PG/ML (ref 232–1245)
WBC # BLD AUTO: 7.5 X10E3/UL (ref 3.4–10.8)

## 2018-06-07 LAB
PSA FREE MFR SERPL: 23.5 %
PSA FREE SERPL-MCNC: 1.08 NG/ML
PSA SERPL-MCNC: 4.6 NG/ML (ref 0–4)

## 2018-06-11 ENCOUNTER — OFFICE VISIT (OUTPATIENT)
Dept: FAMILY MEDICINE CLINIC | Facility: CLINIC | Age: 63
End: 2018-06-11
Payer: COMMERCIAL

## 2018-06-11 VITALS
HEART RATE: 56 BPM | WEIGHT: 200 LBS | TEMPERATURE: 97.5 F | HEIGHT: 75 IN | BODY MASS INDEX: 24.87 KG/M2 | RESPIRATION RATE: 16 BRPM | SYSTOLIC BLOOD PRESSURE: 120 MMHG | DIASTOLIC BLOOD PRESSURE: 88 MMHG | OXYGEN SATURATION: 98 %

## 2018-06-11 DIAGNOSIS — R97.20 ELEVATED PSA MEASUREMENT: Primary | ICD-10-CM

## 2018-06-11 PROBLEM — Z13.6 SCREENING FOR HYPERTENSION: Status: RESOLVED | Noted: 2018-06-01 | Resolved: 2018-06-11

## 2018-06-11 PROBLEM — Z12.11 COLON CANCER SCREENING: Status: RESOLVED | Noted: 2018-06-01 | Resolved: 2018-06-11

## 2018-06-11 PROBLEM — Z12.5 ENCOUNTER FOR PROSTATE CANCER SCREENING: Status: RESOLVED | Noted: 2018-06-01 | Resolved: 2018-06-11

## 2018-06-11 PROBLEM — Z00.00 ROUTINE GENERAL MEDICAL EXAMINATION AT A HEALTH CARE FACILITY: Status: RESOLVED | Noted: 2018-06-01 | Resolved: 2018-06-11

## 2018-06-11 PROBLEM — Z13.29 SCREENING FOR HYPOTHYROIDISM: Status: RESOLVED | Noted: 2018-06-01 | Resolved: 2018-06-11

## 2018-06-11 PROCEDURE — 3008F BODY MASS INDEX DOCD: CPT | Performed by: FAMILY MEDICINE

## 2018-06-11 PROCEDURE — 1036F TOBACCO NON-USER: CPT | Performed by: FAMILY MEDICINE

## 2018-06-11 PROCEDURE — 99213 OFFICE O/P EST LOW 20 MIN: CPT | Performed by: FAMILY MEDICINE

## 2018-06-11 RX ORDER — MELOXICAM 15 MG/1
15 TABLET ORAL
COMMUNITY
Start: 2018-02-21 | End: 2018-06-11 | Stop reason: ALTCHOICE

## 2018-06-11 RX ORDER — CLINDAMYCIN HYDROCHLORIDE 300 MG/1
CAPSULE ORAL
Refills: 0 | COMMUNITY
Start: 2018-06-04 | End: 2019-05-24 | Stop reason: ALTCHOICE

## 2018-06-11 RX ORDER — DIPHENOXYLATE HYDROCHLORIDE AND ATROPINE SULFATE 2.5; .025 MG/1; MG/1
1 TABLET ORAL DAILY
COMMUNITY

## 2018-06-11 RX ORDER — CHLORHEXIDINE GLUCONATE 0.12 MG/ML
RINSE ORAL
Refills: 1 | COMMUNITY
Start: 2018-06-04 | End: 2018-06-11 | Stop reason: ALTCHOICE

## 2018-06-11 NOTE — PATIENT INSTRUCTIONS
DISCUSSED OPTIONS  RECOMMENDED UROLOGIC CONSULT  PATIENT WILL FOLLOW UP IN September WHEN HE IS BACK IN THE AREA

## 2018-06-11 NOTE — PROGRESS NOTES
Assessment/Plan:    Problem List Items Addressed This Visit     Elevated PSA measurement - Primary          Patient Instructions   DISCUSSED OPTIONS  RECOMMENDED UROLOGIC CONSULT  PATIENT WILL FOLLOW UP IN September WHEN HE IS BACK IN THE AREA      Return in about 3 months (around 9/11/2018) for Recheck  Subjective:      Patient ID: Sheryle Calk is a 65 y o  male  Chief Complaint   Patient presents with    discuss bloodwork results       DISCUSSION    REVIEWED BW RESULTS  DISCUSSED ELEVATED PSA READING    REVIEWED THERAPEUTIC OPTIONS        The following portions of the patient's history were reviewed and updated as appropriate: allergies, current medications, past family history, past medical history, past social history, past surgical history and problem list     Review of Systems   Constitutional: Negative for chills, fatigue and fever  HENT: Negative for sore throat  Eyes: Negative for discharge  Respiratory: Negative for cough and chest tightness  Cardiovascular: Negative for chest pain and palpitations  Gastrointestinal: Negative for abdominal pain, diarrhea, nausea and vomiting  Musculoskeletal: Negative for arthralgias and gait problem  Neurological: Negative for dizziness, weakness and headaches  Hematological: Negative for adenopathy  Psychiatric/Behavioral: The patient is not nervous/anxious  Current Outpatient Prescriptions   Medication Sig Dispense Refill    clindamycin (CLEOCIN) 300 MG capsule TAKE 1 CAPSULE BY MOUTH TWICE A DAY DAILY UNTIL FINISHED  0    cyanocobalamin 250 MCG tablet Take 250 mcg by mouth daily      multivitamin (THERAGRAN) TABS Take 1 tablet by mouth daily       No current facility-administered medications for this visit          Objective:    /88   Pulse 56   Temp 97 5 °F (36 4 °C) (Temporal)   Resp 16   Ht 6' 3" (1 905 m)   Wt 90 7 kg (200 lb)   SpO2 98%   BMI 25 00 kg/m²        Physical Exam       NO PHYSICAL EXAM WAS PERFORMED    LENGTH OF VISIT 15 MIN  LENGTH OF  15 MIN    Leonarda Hannah MD

## 2018-07-09 NOTE — DISCHARGE NOTE ADULT - NS MD DC FALL RISK RISK
For information on Fall & Injury Prevention, visit [email or web address]

## 2019-05-24 ENCOUNTER — OFFICE VISIT (OUTPATIENT)
Dept: FAMILY MEDICINE CLINIC | Facility: CLINIC | Age: 64
End: 2019-05-24
Payer: COMMERCIAL

## 2019-05-24 VITALS
RESPIRATION RATE: 16 BRPM | TEMPERATURE: 97 F | DIASTOLIC BLOOD PRESSURE: 58 MMHG | HEIGHT: 75 IN | WEIGHT: 206 LBS | OXYGEN SATURATION: 99 % | HEART RATE: 57 BPM | SYSTOLIC BLOOD PRESSURE: 110 MMHG | BODY MASS INDEX: 25.61 KG/M2

## 2019-05-24 DIAGNOSIS — G62.9 PERIPHERAL POLYNEUROPATHY: ICD-10-CM

## 2019-05-24 DIAGNOSIS — R97.20 ELEVATED PSA MEASUREMENT: ICD-10-CM

## 2019-05-24 DIAGNOSIS — Z12.11 COLON CANCER SCREENING: ICD-10-CM

## 2019-05-24 DIAGNOSIS — Z00.00 ROUTINE GENERAL MEDICAL EXAMINATION AT A HEALTH CARE FACILITY: Primary | ICD-10-CM

## 2019-05-24 DIAGNOSIS — M54.12 CERVICAL RADICULOPATHY: ICD-10-CM

## 2019-05-24 DIAGNOSIS — J30.2 SEASONAL ALLERGIES: ICD-10-CM

## 2019-05-24 DIAGNOSIS — Z13.29 SCREENING FOR HYPOTHYROIDISM: ICD-10-CM

## 2019-05-24 DIAGNOSIS — E53.8 VITAMIN B12 DEFICIENCY: ICD-10-CM

## 2019-05-24 DIAGNOSIS — I34.0 NON-RHEUMATIC MITRAL REGURGITATION: ICD-10-CM

## 2019-05-24 DIAGNOSIS — Z12.5 ENCOUNTER FOR PROSTATE CANCER SCREENING: ICD-10-CM

## 2019-05-24 DIAGNOSIS — E55.9 VITAMIN D DEFICIENCY: ICD-10-CM

## 2019-05-24 DIAGNOSIS — E78.01 FAMILIAL HYPERCHOLESTEROLEMIA: ICD-10-CM

## 2019-05-24 DIAGNOSIS — Z13.6 SCREENING FOR HYPERTENSION: ICD-10-CM

## 2019-05-24 LAB
ECG INTERP DURING EX: ABNORMAL MS
SL AMB  POCT GLUCOSE, UA: 0
SL AMB LEUKOCYTE ESTERASE,UA: 0
SL AMB POCT BILIRUBIN,UA: 0
SL AMB POCT BLOOD,UA: 0
SL AMB POCT CLARITY,UA: CLEAR
SL AMB POCT COLOR,UA: YELLOW
SL AMB POCT FECES OCC BLD: NORMAL
SL AMB POCT KETONES,UA: 0
SL AMB POCT NITRITE,UA: 0
SL AMB POCT PH,UA: 5
SL AMB POCT SPECIFIC GRAVITY,UA: 1.01
SL AMB POCT URINE PROTEIN: 0
SL AMB POCT UROBILINOGEN: 0

## 2019-05-24 PROCEDURE — 99396 PREV VISIT EST AGE 40-64: CPT | Performed by: FAMILY MEDICINE

## 2019-05-24 PROCEDURE — 82270 OCCULT BLOOD FECES: CPT | Performed by: FAMILY MEDICINE

## 2019-05-24 PROCEDURE — 93000 ELECTROCARDIOGRAM COMPLETE: CPT | Performed by: FAMILY MEDICINE

## 2019-05-24 PROCEDURE — 36415 COLL VENOUS BLD VENIPUNCTURE: CPT | Performed by: FAMILY MEDICINE

## 2019-05-24 PROCEDURE — 81003 URINALYSIS AUTO W/O SCOPE: CPT | Performed by: FAMILY MEDICINE

## 2019-05-24 RX ORDER — CHLORHEXIDINE GLUCONATE 0.12 MG/ML
RINSE ORAL
Refills: 1 | COMMUNITY
Start: 2019-05-21

## 2019-05-25 LAB
25(OH)D3+25(OH)D2 SERPL-MCNC: 32.7 NG/ML (ref 30–100)
BASOPHILS # BLD AUTO: NORMAL 10*3/UL
BASOPHILS NFR BLD AUTO: NORMAL %
EOSINOPHIL # BLD AUTO: NORMAL 10*3/UL
EOSINOPHIL NFR BLD AUTO: NORMAL %
ERYTHROCYTE [DISTWIDTH] IN BLOOD BY AUTOMATED COUNT: NORMAL %
HCT VFR BLD AUTO: NORMAL %
HGB BLD-MCNC: NORMAL G/DL
IMM GRANULOCYTES # BLD: NORMAL 10*3/UL
IMM GRANULOCYTES NFR BLD: NORMAL %
LYMPHOCYTES # BLD AUTO: NORMAL 10*3/UL
LYMPHOCYTES NFR BLD AUTO: NORMAL %
MCH RBC QN AUTO: NORMAL PG
MCHC RBC AUTO-ENTMCNC: NORMAL G/DL
MCV RBC AUTO: NORMAL FL
MONOCYTES # BLD AUTO: NORMAL 10*3/UL
MONOCYTES NFR BLD AUTO: NORMAL %
NEUTROPHILS # BLD AUTO: NORMAL 10*3/UL
NEUTROPHILS NFR BLD AUTO: NORMAL %
PLATELET # BLD AUTO: NORMAL 10*3/UL
RBC # BLD AUTO: NORMAL 10*6/UL
VIT B12 SERPL-MCNC: 466 PG/ML (ref 232–1245)
WBC # BLD AUTO: NORMAL 10*3/UL

## 2019-06-03 ENCOUNTER — TELEPHONE (OUTPATIENT)
Dept: FAMILY MEDICINE CLINIC | Facility: CLINIC | Age: 64
End: 2019-06-03

## 2019-06-04 LAB
BASOPHILS # BLD AUTO: 0 X10E3/UL (ref 0–0.2)
BASOPHILS NFR BLD AUTO: 1 %
EOSINOPHIL # BLD AUTO: 0.1 X10E3/UL (ref 0–0.4)
EOSINOPHIL NFR BLD AUTO: 2 %
ERYTHROCYTE [DISTWIDTH] IN BLOOD BY AUTOMATED COUNT: 13.9 % (ref 12.3–15.4)
HCT VFR BLD AUTO: 46 % (ref 37.5–51)
HGB BLD-MCNC: 15.8 G/DL (ref 13–17.7)
IMM GRANULOCYTES # BLD: 0 X10E3/UL (ref 0–0.1)
IMM GRANULOCYTES NFR BLD: 0 %
LYMPHOCYTES # BLD AUTO: 2 X10E3/UL (ref 0.7–3.1)
LYMPHOCYTES NFR BLD AUTO: 34 %
MCH RBC QN AUTO: 32.6 PG (ref 26.6–33)
MCHC RBC AUTO-ENTMCNC: 34.3 G/DL (ref 31.5–35.7)
MCV RBC AUTO: 95 FL (ref 79–97)
MONOCYTES # BLD AUTO: 0.6 X10E3/UL (ref 0.1–0.9)
MONOCYTES NFR BLD AUTO: 9 %
NEUTROPHILS # BLD AUTO: 3.2 X10E3/UL (ref 1.4–7)
NEUTROPHILS NFR BLD AUTO: 54 %
PLATELET # BLD AUTO: 229 X10E3/UL (ref 150–450)
RBC # BLD AUTO: 4.84 X10E6/UL (ref 4.14–5.8)
WBC # BLD AUTO: 6 X10E3/UL (ref 3.4–10.8)

## 2019-06-07 DIAGNOSIS — E78.01 FAMILIAL HYPERCHOLESTEROLEMIA: Primary | ICD-10-CM

## 2019-06-26 LAB — LABCORP COMMENT: NORMAL

## 2020-09-08 ENCOUNTER — APPOINTMENT (RX ONLY)
Dept: URBAN - METROPOLITAN AREA CLINIC 121 | Facility: CLINIC | Age: 65
Setting detail: DERMATOLOGY
End: 2020-09-08

## 2020-09-08 DIAGNOSIS — D18.0 HEMANGIOMA: ICD-10-CM

## 2020-09-08 DIAGNOSIS — L81.4 OTHER MELANIN HYPERPIGMENTATION: ICD-10-CM

## 2020-09-08 DIAGNOSIS — Z71.89 OTHER SPECIFIED COUNSELING: ICD-10-CM

## 2020-09-08 DIAGNOSIS — L82.1 OTHER SEBORRHEIC KERATOSIS: ICD-10-CM

## 2020-09-08 PROBLEM — D18.01 HEMANGIOMA OF SKIN AND SUBCUTANEOUS TISSUE: Status: ACTIVE | Noted: 2020-09-08

## 2020-09-08 PROCEDURE — 99203 OFFICE O/P NEW LOW 30 MIN: CPT

## 2020-09-08 PROCEDURE — ? COUNSELING

## 2020-09-08 ASSESSMENT — LOCATION SIMPLE DESCRIPTION DERM
LOCATION SIMPLE: RIGHT UPPER BACK
LOCATION SIMPLE: RIGHT FOREARM
LOCATION SIMPLE: RIGHT CALF
LOCATION SIMPLE: UPPER BACK
LOCATION SIMPLE: ABDOMEN
LOCATION SIMPLE: LEFT LOWER BACK
LOCATION SIMPLE: LEFT FOREARM
LOCATION SIMPLE: LEFT CALF

## 2020-09-08 ASSESSMENT — LOCATION ZONE DERM
LOCATION ZONE: LEG
LOCATION ZONE: ARM
LOCATION ZONE: TRUNK

## 2020-09-08 ASSESSMENT — LOCATION DETAILED DESCRIPTION DERM
LOCATION DETAILED: RIGHT DISTAL DORSAL FOREARM
LOCATION DETAILED: LEFT DISTAL CALF
LOCATION DETAILED: LEFT PROXIMAL DORSAL FOREARM
LOCATION DETAILED: RIGHT DISTAL CALF
LOCATION DETAILED: RIGHT SUPERIOR LATERAL UPPER BACK
LOCATION DETAILED: PERIUMBILICAL SKIN
LOCATION DETAILED: LEFT INFERIOR MEDIAL MIDBACK
LOCATION DETAILED: SUPERIOR THORACIC SPINE

## 2020-09-08 NOTE — PROCEDURE: MIPS QUALITY
Additional Notes: patient states he does not take any medication
Quality 130: Documentation Of Current Medications In The Medical Record: Eligible clinician attests to documenting in the medical record the patient is not eligible for a current list of medications being obtained, updated, or reviewed by the eligible clinician
Detail Level: Detailed

## 2022-01-13 ENCOUNTER — TELEPHONE (OUTPATIENT)
Dept: FAMILY MEDICINE CLINIC | Facility: CLINIC | Age: 67
End: 2022-01-13

## 2022-01-13 NOTE — TELEPHONE ENCOUNTER
Saad Ree are now living in [place] and no longer seeing Dr Millie King  Please remove Dr Millie King as their pcp    Thanks

## 2022-04-13 NOTE — TELEPHONE ENCOUNTER
04/13/22 10:10 AM     Thank you for your request  Your request has been received, reviewed, and the patient chart updated  The PCP has successfully been removed with a patient attribution note  This message will now be completed      Thank you  Adrienne Lafleur

## 2022-11-03 NOTE — DISCHARGE NOTE ADULT - PATIENT PORTAL LINK FT
“You can access the FollowHealth Patient Portal, offered by [facility], by registering with the following website: [email or web address]” If you are a smoker, it is important for your health to stop smoking. Please be aware that second hand smoke is also harmful.

## 2023-10-06 NOTE — ED ADULT NURSE NOTE - PAIN RATING/NUMBER SCALE (0-10): ACTIVITY
Instructions: This plan will send the code FBSE to the PM system.  DO NOT or CHANGE the price.
Detail Level: Simple
Price (Do Not Change): 0.00
7

## 2024-02-10 NOTE — PROGRESS NOTES
[address] [facility], [address] [facility]  [phone]           Patient: Sony Santos                MRN: [id number]       SSN: [id number]  YOB: 1955        AGE: 65 y.o. SEX: male  Body mass index is 25.12 kg/(m^2). PCP: None  04/13/18      This office note has been dictated. REVIEW OF SYSTEMS:  Constitutional: Negative for fever, chills, weight loss and malaise/fatigue. HENT: Negative. Eyes: Negative. Respiratory: Negative. Cardiovascular: Negative. Gastrointestinal: No bowel incontinence or constipation. Genitourinary: No bladder incontinence or saddle anesthesia. Skin: Negative. Neurological: Negative. Endo/Heme/Allergies: Negative. Psychiatric/Behavioral: Negative. Musculoskeletal: As per HPI above. Past Medical History:   Diagnosis Date    Arthritis          Current Outpatient Prescriptions:     meloxicam (MOBIC) 15 mg tablet, Take 1 Tab by mouth daily. , Disp: 30 Tab, Rfl: 2    Allergies   Allergen Reactions    Iodine Hives    Penicillins Hives       Social History     Social History    Marital status:      Spouse name: N/A    Number of children: N/A    Years of education: N/A     Occupational History    Not on file. Social History Main Topics    Smoking status: Never Smoker    Smokeless tobacco: Never Used    Alcohol use 1.8 oz/week     3 Glasses of wine per week      Comment: occasionally    Drug use: No    Sexual activity: Not on file     Other Topics Concern    Not on file     Social History Narrative       Past Surgical History:   Procedure Laterality Date    SPINE SURGERY PROCEDURE UNLISTED             SUBJECTIVE:   The patient is seen today for reevaluation of his right hip. The patient was seen last by Dr. Zena Jones and had an injection for bursitis. He states the injection did not help much. However, the hip is feeling better.   He was having laterally-based discomfort, worse when he lies on his side at night, which has dissipated. He denies any groin pain or thigh pain. He has had no radiating pain or numbness down his lower extremities. He denies any catching or locking noted in the hip. He has had no change in his bowel or bladder habits. He denies any frequency or urgency or incomplete emptying. PHYSICAL EXAMINATION:  In general, the patient is alert and oriented x 3 in no acute distress. The patient is well-developed, well-nourished, with a normal affect. The patient is afebrile. HEENT:  Head is normocephalic and atraumatic. Pupils are equally round and reactive to light and accommodation. Extraocular eye movements are intact. Neck is supple. Trachea is midline. No JVD is present. Breathing is nonlabored. Examination of the lower extremities reveals good range of motion of the hips. There is no pain to palpation of the greater trochanteric bursae. There is negative straight leg raise. There is negative calf tenderness. There is negative Cristinas. There is no evidence of DVT present. RADIOGRAPHS:  Review of the MRI of the right hip shows no fracture and no AVN. There is a possible slight irregularity of the lateral labrum. There is some mild edema in the right SI joint, degenerative, as well as an enlarged prostate. ASSESSMENT:  Right hip trochanteric bursitis. PLAN:  At this point, we did discuss treatment options. He will continue to finish up his Mobic and do activities as tolerated. If he does begin to have any labral issues, groin pain, catching, or locking, we may need to move forward with an MRI arthrogram.  I have asked him to discuss the questionable large prostate with his PCP and was given a copy of the MRI results.                    JR Cliff RUSS, THERESA, ATC PAST SURGICAL HISTORY:  History of cholecystectomy

## 2024-04-15 PROBLEM — Z00.00 ENCOUNTER FOR PREVENTIVE HEALTH EXAMINATION: Status: ACTIVE | Noted: 2024-04-15

## 2024-06-18 ENCOUNTER — APPOINTMENT (OUTPATIENT)
Dept: ORTHOPEDIC SURGERY | Facility: CLINIC | Age: 69
End: 2024-06-18
Payer: MEDICARE

## 2024-06-18 ENCOUNTER — TRANSCRIPTION ENCOUNTER (OUTPATIENT)
Age: 69
End: 2024-06-18

## 2024-06-18 VITALS — HEIGHT: 75 IN | BODY MASS INDEX: 24.25 KG/M2 | WEIGHT: 195 LBS

## 2024-06-18 DIAGNOSIS — M54.2 CERVICALGIA: ICD-10-CM

## 2024-06-18 DIAGNOSIS — M79.604 PAIN IN RIGHT LEG: ICD-10-CM

## 2024-06-18 DIAGNOSIS — M79.605 PAIN IN RIGHT LEG: ICD-10-CM

## 2024-06-18 DIAGNOSIS — M54.16 RADICULOPATHY, LUMBAR REGION: ICD-10-CM

## 2024-06-18 PROCEDURE — 99202 OFFICE O/P NEW SF 15 MIN: CPT

## 2024-06-19 NOTE — HISTORY OF PRESENT ILLNESS
[de-identified] : Patient presents with his wife. He has chronic pain in his cervical thoracic and lumbar spines. He is here today specifically for right-sided hip and thigh pain and the medial aspect of the thigh. He has a lumbar MRI consistent with severe lumbar stenosis, L2-3 consistent with the symptoms, which have thus far been recalcitrant to conservative treatment, including but not limited to anti-inflammatories, gabapentin, pain management, oral steroids, physical therapy and other.  He describes an episode of sharp right hip pain on 5/8 that cause him to wake up at 0200 and go to ER. He has been doing PT and his recommended at home stretching consistently. He still has some lower back stiffness and pain down the medial aspect of the thig and across the knee, which is causing him to limp. He has been taking Gabapentin, between 600-900mg, for the nerve pain and tingling relating to his neck. Also taking ibuprofen.

## 2024-06-19 NOTE — PHYSICAL EXAM
[Normal] : Alert and in no acute distress [de-identified] :  Lumbar Spine: Full ROM x 4 without pain, -ttp throughout. 5/5 throughout b/l LEs. SILT throughout LEs.

## 2024-06-19 NOTE — PLAN
[TextEntry] : Alfredo will be sent for a cervical MRI. He will mail the cervical MRI, thoracic and lumbar MRIs to my home for further review. His right-sided thigh pain is likely coming from the stenosis at L2-3. And for this, I've recommended decompressive laminectomy, pending review of the aforementioned films.  I recommend that he acquire the actual films films as today we just had the reports. He will mail them to my home prior to consideration of further treatment, which will likely include decompressive laminectomy at L2-3. In addition, based on his complex history of prior cervical laminectomy, I've recommended an MRI of the cervical spine. He will mail this to my home. I am optimistic outpatient decompressive laminectomy will improve his right-sided thigh pain. In addition, we will perform an ultrasound of his bilateral lower extremities to rule out DVT.

## 2024-06-19 NOTE — ADDENDUM
[FreeTextEntry1] :   Documented by Amadeo Schwartz acting as a scribe for Dr. Will Oglesby. 06/18/2024 Documented by Aubrie Castro acting as a scribe for Dr. Will Oglesby. 6/18/2024

## 2024-06-19 NOTE — REASON FOR VISIT
[Neck Pain] : neck pain [FreeTextEntry2] : one episode of severe right hip pain that occurred back in May, and constant right medial thigh pain.

## 2024-06-26 ENCOUNTER — TRANSCRIPTION ENCOUNTER (OUTPATIENT)
Age: 69
End: 2024-06-26

## 2024-07-31 ENCOUNTER — TRANSCRIPTION ENCOUNTER (OUTPATIENT)
Age: 69
End: 2024-07-31

## 2024-08-20 ENCOUNTER — NON-APPOINTMENT (OUTPATIENT)
Age: 69
End: 2024-08-20

## 2024-08-20 ENCOUNTER — TRANSCRIPTION ENCOUNTER (OUTPATIENT)
Age: 69
End: 2024-08-20

## 2024-08-21 ENCOUNTER — TRANSCRIPTION ENCOUNTER (OUTPATIENT)
Age: 69
End: 2024-08-21

## 2024-08-26 ENCOUNTER — TRANSCRIPTION ENCOUNTER (OUTPATIENT)
Age: 69
End: 2024-08-26

## 2024-08-27 ENCOUNTER — TRANSCRIPTION ENCOUNTER (OUTPATIENT)
Age: 69
End: 2024-08-27

## 2024-09-24 ENCOUNTER — TRANSCRIPTION ENCOUNTER (OUTPATIENT)
Age: 69
End: 2024-09-24

## 2024-10-04 ENCOUNTER — TRANSCRIPTION ENCOUNTER (OUTPATIENT)
Age: 69
End: 2024-10-04

## 2024-10-11 ENCOUNTER — APPOINTMENT (OUTPATIENT)
Dept: ORTHOPEDIC SURGERY | Facility: AMBULATORY SURGERY CENTER | Age: 69
End: 2024-10-11
Payer: MEDICARE

## 2024-10-11 PROBLEM — R11.2 NAUSEA AND/OR VOMITING: Status: ACTIVE | Noted: 2024-10-11

## 2024-10-11 PROCEDURE — 63047 LAM FACETEC & FORAMOT LUMBAR: CPT

## 2024-10-11 RX ORDER — OXYCODONE AND ACETAMINOPHEN 5; 325 MG/1; MG/1
5-325 TABLET ORAL
Qty: 30 | Refills: 0 | Status: ACTIVE | COMMUNITY
Start: 2024-10-11 | End: 1900-01-01

## 2024-10-11 RX ORDER — GABAPENTIN 300 MG/1
300 CAPSULE ORAL 3 TIMES DAILY
Qty: 30 | Refills: 0 | Status: ACTIVE | COMMUNITY
Start: 2024-10-11 | End: 1900-01-01

## 2024-10-15 ENCOUNTER — NON-APPOINTMENT (OUTPATIENT)
Age: 69
End: 2024-10-15

## 2024-10-15 ENCOUNTER — APPOINTMENT (OUTPATIENT)
Dept: ORTHOPEDIC SURGERY | Facility: CLINIC | Age: 69
End: 2024-10-15
Payer: MEDICARE

## 2024-10-15 VITALS — HEART RATE: 65 BPM | SYSTOLIC BLOOD PRESSURE: 131 MMHG | OXYGEN SATURATION: 98 % | DIASTOLIC BLOOD PRESSURE: 80 MMHG

## 2024-10-15 DIAGNOSIS — Z87.898 PERSONAL HISTORY OF OTHER SPECIFIED CONDITIONS: ICD-10-CM

## 2024-10-15 DIAGNOSIS — Z98.890 OTHER SPECIFIED POSTPROCEDURAL STATES: ICD-10-CM

## 2024-10-15 DIAGNOSIS — T81.41XA INFECTION FOLLOWING A PROCEDURE,SUPERFICIAL INCISIONAL SURGI SITE,INITIAL ENC: ICD-10-CM

## 2024-10-15 DIAGNOSIS — M54.16 RADICULOPATHY, LUMBAR REGION: ICD-10-CM

## 2024-10-15 PROCEDURE — 72100 X-RAY EXAM L-S SPINE 2/3 VWS: CPT

## 2024-10-15 PROCEDURE — 99024 POSTOP FOLLOW-UP VISIT: CPT

## 2024-10-15 RX ORDER — CLINDAMYCIN HYDROCHLORIDE 300 MG/1
300 CAPSULE ORAL EVERY 6 HOURS
Qty: 40 | Refills: 0 | Status: ACTIVE | COMMUNITY
Start: 2024-10-15 | End: 1900-01-01

## 2024-10-15 RX ORDER — ONDANSETRON 8 MG/1
8 TABLET, ORALLY DISINTEGRATING ORAL 3 TIMES DAILY
Qty: 15 | Refills: 0 | Status: DISCONTINUED | COMMUNITY
Start: 2024-10-11 | End: 2024-10-15

## 2024-11-25 ENCOUNTER — TRANSCRIPTION ENCOUNTER (OUTPATIENT)
Age: 69
End: 2024-11-25

## 2025-04-14 ENCOUNTER — TRANSCRIPTION ENCOUNTER (OUTPATIENT)
Age: 70
End: 2025-04-14

## 2025-04-16 ENCOUNTER — TRANSCRIPTION ENCOUNTER (OUTPATIENT)
Age: 70
End: 2025-04-16

## 2025-04-29 ENCOUNTER — TRANSCRIPTION ENCOUNTER (OUTPATIENT)
Age: 70
End: 2025-04-29

## 2025-05-15 ENCOUNTER — TRANSCRIPTION ENCOUNTER (OUTPATIENT)
Age: 70
End: 2025-05-15

## 2025-05-15 DIAGNOSIS — M48.061 SPINAL STENOSIS, LUMBAR REGION WITHOUT NEUROGENIC CLAUDICATION: ICD-10-CM

## 2025-05-15 RX ORDER — DICLOFENAC SODIUM 75 MG/1
75 TABLET, DELAYED RELEASE ORAL
Qty: 60 | Refills: 0 | Status: ACTIVE | COMMUNITY
Start: 2025-05-15 | End: 1900-01-01

## 2025-07-10 ENCOUNTER — TRANSCRIPTION ENCOUNTER (OUTPATIENT)
Age: 70
End: 2025-07-10